# Patient Record
Sex: FEMALE | Race: BLACK OR AFRICAN AMERICAN | NOT HISPANIC OR LATINO | Employment: STUDENT | URBAN - METROPOLITAN AREA
[De-identification: names, ages, dates, MRNs, and addresses within clinical notes are randomized per-mention and may not be internally consistent; named-entity substitution may affect disease eponyms.]

---
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
## 2020-05-05 ENCOUNTER — HOSPITAL ENCOUNTER (EMERGENCY)
Facility: CLINIC | Age: 20
Discharge: HOME | End: 2020-05-05
Attending: NURSE PRACTITIONER | Admitting: NURSE PRACTITIONER
Payer: COMMERCIAL

## 2020-05-05 VITALS
DIASTOLIC BLOOD PRESSURE: 61 MMHG | TEMPERATURE: 97.8 F | HEART RATE: 89 BPM | RESPIRATION RATE: 16 BRPM | WEIGHT: 170 LBS | OXYGEN SATURATION: 97 % | HEIGHT: 63 IN | BODY MASS INDEX: 30.12 KG/M2 | SYSTOLIC BLOOD PRESSURE: 115 MMHG

## 2020-05-05 DIAGNOSIS — L98.9 SKIN LESION: ICD-10-CM

## 2020-05-05 PROCEDURE — 99282 EMERGENCY DEPT VISIT SF MDM: CPT

## 2020-05-05 RX ORDER — SULFAMETHOXAZOLE/TRIMETHOPRIM 800-160 MG
1 TABLET ORAL 2 TIMES DAILY
Qty: 20 TABLET | Refills: 0 | Status: SHIPPED | OUTPATIENT
Start: 2020-05-05 | End: 2020-05-07 | Stop reason: ALTCHOICE

## 2020-05-05 RX ORDER — HYDROCODONE BITARTRATE AND ACETAMINOPHEN 5; 325 MG/1; MG/1
1 TABLET ORAL EVERY 6 HOURS PRN
Qty: 6 TABLET | Refills: 0 | Status: SHIPPED | OUTPATIENT
Start: 2020-05-05 | End: 2020-05-07

## 2020-05-05 ASSESSMENT — MIFFLIN-ST. JEOR: SCORE: 1515.24

## 2020-05-05 ASSESSMENT — ENCOUNTER SYMPTOMS
DYSURIA: 0
CHILLS: 0
FEVER: 0
VOMITING: 0
COUGH: 0
NAUSEA: 0

## 2020-05-05 NOTE — ED TRIAGE NOTES
Itching on back of legs and hands for past 3 weeks - increase itching with pimp like redness on back of legs and under left arm

## 2020-05-05 NOTE — ED AVS SNAPSHOT
Emergency Department  [address] [place] [address]  Phone:  [phone]  Fax:  [phone]                                    Chino Ken   MRN: [id number]    Department:   Emergency Department   Date of Visit:  5/5/2020           After Visit Summary Signature Page    I have received my discharge instructions, and my questions have been answered. I have discussed any challenges I see with this plan with the nurse or doctor.    ..........................................................................................................................................  Patient/Patient Representative Signature      ..........................................................................................................................................  Patient Representative Print Name and Relationship to Patient    ..................................................               ................................................  Date                                   Time    ..........................................................................................................................................  Reviewed by Signature/Title    ...................................................              ..............................................  Date                                               Time          22EPIC Rev 08/18

## 2020-05-05 NOTE — ED PROVIDER NOTES
"  History     Chief Complaint:    Rash    The history is provided by the patient.      Chino Ken is a 19 year old female who presents with a rash. She presents with several small painful bumps over her posterior thighs. This has been ongoing for 2 weeks. She denies any fevers, dysuria, cough, chills, nausea, vomiting, pain with bowel movements, or drainage from the bumps. She has no known history of MRSA. She saw her PCP for this about 1 year ago. She was given an ointment and some pills, which improved the rash. However, this was not found in Care Everywhere during chart review.    Allergies:  Contrast dye    Medications:    Flonase  Oxycodone  Polytrim  Cetaphil  Selenium sulfide  Albuterol inhaler  Zyrtec  Zoloft     Past Medical History:    Asthma  ADHD  Allergic rhinitis    Past Surgical History:    The patient does not have any pertinent past surgical history.    Family History:    No past pertinent family history.    Social History:  Negative for tobacco use.  Negative for alcohol use.  Negative for drug use.  Marital Status:  Single [1]     Review of Systems   Constitutional: Negative for chills and fever.   Respiratory: Negative for cough.    Gastrointestinal: Negative for nausea and vomiting.        Denies pain with bowel movements.   Genitourinary: Negative for dysuria.   Skin: Positive for rash (no drainage).   All other systems reviewed and are negative.        Physical Exam     Patient Vitals for the past 24 hrs:   BP Temp Temp src Pulse Resp SpO2 Height Weight   05/05/20 1427 115/61 97.8  F (36.6  C) Oral 89 16 97 % 1.6 m (5' 3\") 77.1 kg (170 lb)     Physical Exam  Physical Exam   Constitutional:  Laying in bed on her side. Mild discomfort. Non toxic appearing.  Head: Head moves freely with normal range of motion.   ENT: Oropharynx is clear and moist.   Eyes: Conjunctivae pink. EOMs intact.   Neck: Normal range of motion.   Cardiovascular: Regular rate and rhythm. Normal heart sounds. No concerning " "murmur. Intact distal pulses: radial pulses 2+ on the right, 2+ on the left.   Pulmonary/Chest: No respiratory distress. No decreased breath sounds. No wheezes. No rhonchi. No rales. Lungs clear throughout.   Abdominal: Soft. Non-tender. No rebound, no guarding.   Musculoskeletal: No peripheral edema. Distal capillary refill and sensation intact.   Neurological: Oriented to person, place, and time. No focal deficits.   Skin: Several small bumps across the back of her thighs. These have a central raised areas and firmness. No fluctuance to palpation. No erythema or heat to touch. Unable to express purulent drainage from the central area. She has approximately 3 of lesions on each posterior thigh.     Emergency Department Course     Emergency Department Course:  Past medical records, nursing notes, and vitals reviewed.    1521: I performed an exam of the patient as documented above.     I personally reviewed the results with the Patient and answered all related questions prior to discharge.     Findings and plan explained to the Patient. Patient discharged home with instructions regarding supportive care, medications, and reasons to return. The importance of close follow-up was reviewed.     Impression & Plan     Medical Decision Making:  Chino Ken is a 19 year old female with ongoing painful and itchy rash to the back of her thighs. She notes having something similar in the past treated successfully with ointments and \"pills\". I could find no such visit in care everywhere. Here she is afebrile and non-toxic appearing. No exam evidence of cellulitis. She has several small skin lesions that look like small abscesses, although they are quite firm (not fluctuant) and tender and I do not feel amenable to bedside I & D although I did offer to I & D the largest one (measuring about 1.5 cm x 2cm) and she declined. I suspect she has MRSA and will treat as such. We discussed warm soaks, Bactrim and need for recheck in clinic " or here in 3 days and referral from PCP for Dermatology. She is amenable to plan.         Diagnosis:    ICD-10-CM    1. Skin lesion  L98.9     concerning for MRSA     Disposition:  Discharged to home.    Discharge Medications:  New Prescriptions    HYDROCODONE-ACETAMINOPHEN (NORCO) 5-325 MG TABLET    Take 1 tablet by mouth every 6 hours as needed for severe pain    SULFAMETHOXAZOLE-TRIMETHOPRIM (BACTRIM DS) 800-160 MG TABLET    Take 1 tablet by mouth 2 times daily for 10 days     Scribe Disclosure:  ANDREW, Mariaa Delgado, am serving as a scribe at 2:43 PM on 5/5/2020 to document services personally performed by Zully Dubon NP based on my observations and the provider's statements to me.     Mariaa Delgado  5/5/2020    EMERGENCY DEPARTMENT       Zully Dubon, DEIDRE CNP  05/05/20 6711

## 2020-05-07 ENCOUNTER — HOSPITAL ENCOUNTER (EMERGENCY)
Facility: CLINIC | Age: 20
Discharge: HOME | End: 2020-05-07
Attending: EMERGENCY MEDICINE | Admitting: EMERGENCY MEDICINE
Payer: COMMERCIAL

## 2020-05-07 VITALS
WEIGHT: 178.6 LBS | DIASTOLIC BLOOD PRESSURE: 24 MMHG | HEIGHT: 64 IN | BODY MASS INDEX: 30.49 KG/M2 | SYSTOLIC BLOOD PRESSURE: 100 MMHG | TEMPERATURE: 98.7 F | OXYGEN SATURATION: 98 % | HEART RATE: 94 BPM | RESPIRATION RATE: 14 BRPM

## 2020-05-07 DIAGNOSIS — L03.317 ABSCESS AND CELLULITIS OF GLUTEAL REGION: ICD-10-CM

## 2020-05-07 DIAGNOSIS — L02.31 ABSCESS AND CELLULITIS OF GLUTEAL REGION: ICD-10-CM

## 2020-05-07 DIAGNOSIS — L02.412 ABSCESS OF AXILLA, LEFT: ICD-10-CM

## 2020-05-07 DIAGNOSIS — B36.0 TINEA VERSICOLOR: ICD-10-CM

## 2020-05-07 LAB
ALBUMIN SERPL-MCNC: 3.4 G/DL (ref 3.4–5)
ALP SERPL-CCNC: 69 U/L (ref 40–150)
ALT SERPL W P-5'-P-CCNC: 19 U/L (ref 0–50)
ANION GAP SERPL CALCULATED.3IONS-SCNC: 8 MMOL/L (ref 3–14)
AST SERPL W P-5'-P-CCNC: 25 U/L (ref 0–35)
BASOPHILS # BLD AUTO: 0 10E9/L (ref 0–0.2)
BASOPHILS NFR BLD AUTO: 0.1 %
BILIRUB SERPL-MCNC: 0.3 MG/DL (ref 0.2–1.3)
BUN SERPL-MCNC: 7 MG/DL (ref 7–30)
CALCIUM SERPL-MCNC: 8.4 MG/DL (ref 8.5–10.1)
CHLORIDE SERPL-SCNC: 103 MMOL/L (ref 96–110)
CO2 SERPL-SCNC: 22 MMOL/L (ref 20–32)
CREAT SERPL-MCNC: 0.81 MG/DL (ref 0.5–1)
DIFFERENTIAL METHOD BLD: NORMAL
EOSINOPHIL # BLD AUTO: 0 10E9/L (ref 0–0.7)
EOSINOPHIL NFR BLD AUTO: 0.4 %
ERYTHROCYTE [DISTWIDTH] IN BLOOD BY AUTOMATED COUNT: 11.7 % (ref 10–15)
GFR SERPL CREATININE-BSD FRML MDRD: >90 ML/MIN/{1.73_M2}
GLUCOSE SERPL-MCNC: 88 MG/DL (ref 70–99)
GRAM STN SPEC: ABNORMAL
GRAM STN SPEC: ABNORMAL
HBA1C MFR BLD: 5.6 % (ref 0–5.6)
HCG UR QL: NEGATIVE
HCT VFR BLD AUTO: 39.1 % (ref 35–47)
HGB BLD-MCNC: 12.5 G/DL (ref 11.7–15.7)
IMM GRANULOCYTES # BLD: 0 10E9/L (ref 0–0.4)
IMM GRANULOCYTES NFR BLD: 0.3 %
LYMPHOCYTES # BLD AUTO: 2.1 10E9/L (ref 0.8–5.3)
LYMPHOCYTES NFR BLD AUTO: 21.8 %
Lab: ABNORMAL
MCH RBC QN AUTO: 29.4 PG (ref 26.5–33)
MCHC RBC AUTO-ENTMCNC: 32 G/DL (ref 31.5–36.5)
MCV RBC AUTO: 92 FL (ref 78–100)
MONOCYTES # BLD AUTO: 0.4 10E9/L (ref 0–1.3)
MONOCYTES NFR BLD AUTO: 4.3 %
NEUTROPHILS # BLD AUTO: 6.9 10E9/L (ref 1.6–8.3)
NEUTROPHILS NFR BLD AUTO: 73.1 %
NRBC # BLD AUTO: 0 10*3/UL
NRBC BLD AUTO-RTO: 0 /100
PLATELET # BLD AUTO: 272 10E9/L (ref 150–450)
POTASSIUM SERPL-SCNC: 3.3 MMOL/L (ref 3.4–5.3)
PROT SERPL-MCNC: 8 G/DL (ref 6.8–8.8)
RBC # BLD AUTO: 4.25 10E12/L (ref 3.8–5.2)
SODIUM SERPL-SCNC: 133 MMOL/L (ref 133–144)
SPECIMEN SOURCE: ABNORMAL
TSH SERPL DL<=0.005 MIU/L-ACNC: 0.72 MU/L (ref 0.4–4)
WBC # BLD AUTO: 9.5 10E9/L (ref 4–11)

## 2020-05-07 PROCEDURE — 96361 HYDRATE IV INFUSION ADD-ON: CPT | Performed by: EMERGENCY MEDICINE

## 2020-05-07 PROCEDURE — 10061 I&D ABSCESS COMP/MULTIPLE: CPT | Mod: Z6 | Performed by: EMERGENCY MEDICINE

## 2020-05-07 PROCEDURE — 25000128 H RX IP 250 OP 636: Performed by: EMERGENCY MEDICINE

## 2020-05-07 PROCEDURE — 84443 ASSAY THYROID STIM HORMONE: CPT | Performed by: EMERGENCY MEDICINE

## 2020-05-07 PROCEDURE — 25000132 ZZH RX MED GY IP 250 OP 250 PS 637: Performed by: EMERGENCY MEDICINE

## 2020-05-07 PROCEDURE — 25000125 ZZHC RX 250: Performed by: EMERGENCY MEDICINE

## 2020-05-07 PROCEDURE — 25800030 ZZH RX IP 258 OP 636: Performed by: EMERGENCY MEDICINE

## 2020-05-07 PROCEDURE — 99285 EMERGENCY DEPT VISIT HI MDM: CPT | Mod: 25 | Performed by: EMERGENCY MEDICINE

## 2020-05-07 PROCEDURE — 96374 THER/PROPH/DIAG INJ IV PUSH: CPT | Performed by: EMERGENCY MEDICINE

## 2020-05-07 PROCEDURE — 87205 SMEAR GRAM STAIN: CPT | Performed by: EMERGENCY MEDICINE

## 2020-05-07 PROCEDURE — 83036 HEMOGLOBIN GLYCOSYLATED A1C: CPT | Performed by: EMERGENCY MEDICINE

## 2020-05-07 PROCEDURE — 87070 CULTURE OTHR SPECIMN AEROBIC: CPT | Performed by: EMERGENCY MEDICINE

## 2020-05-07 PROCEDURE — 10061 I&D ABSCESS COMP/MULTIPLE: CPT | Performed by: EMERGENCY MEDICINE

## 2020-05-07 PROCEDURE — 87077 CULTURE AEROBIC IDENTIFY: CPT | Performed by: EMERGENCY MEDICINE

## 2020-05-07 PROCEDURE — 80053 COMPREHEN METABOLIC PANEL: CPT | Performed by: EMERGENCY MEDICINE

## 2020-05-07 PROCEDURE — 76882 US LMTD JT/FCL EVL NVASC XTR: CPT | Mod: 26 | Performed by: EMERGENCY MEDICINE

## 2020-05-07 PROCEDURE — 85025 COMPLETE CBC W/AUTO DIFF WBC: CPT | Performed by: EMERGENCY MEDICINE

## 2020-05-07 PROCEDURE — 87186 SC STD MICRODIL/AGAR DIL: CPT | Performed by: EMERGENCY MEDICINE

## 2020-05-07 PROCEDURE — 81025 URINE PREGNANCY TEST: CPT | Performed by: EMERGENCY MEDICINE

## 2020-05-07 PROCEDURE — 96375 TX/PRO/DX INJ NEW DRUG ADDON: CPT | Performed by: EMERGENCY MEDICINE

## 2020-05-07 RX ORDER — ACETAMINOPHEN 500 MG
500 TABLET ORAL EVERY 6 HOURS PRN
Qty: 28 TABLET | Refills: 0 | Status: SHIPPED | OUTPATIENT
Start: 2020-05-07 | End: 2020-05-14

## 2020-05-07 RX ORDER — NALOXONE HYDROCHLORIDE 0.4 MG/ML
.1-.4 INJECTION, SOLUTION INTRAMUSCULAR; INTRAVENOUS; SUBCUTANEOUS
Status: DISCONTINUED | OUTPATIENT
Start: 2020-05-07 | End: 2020-05-07 | Stop reason: HOSPADM

## 2020-05-07 RX ORDER — CLINDAMYCIN HCL 300 MG
300 CAPSULE ORAL 4 TIMES DAILY
Qty: 40 CAPSULE | Refills: 0 | Status: SHIPPED | OUTPATIENT
Start: 2020-05-08 | End: 2020-05-18

## 2020-05-07 RX ORDER — FLUMAZENIL 0.1 MG/ML
0.2 INJECTION, SOLUTION INTRAVENOUS
Status: DISCONTINUED | OUTPATIENT
Start: 2020-05-07 | End: 2020-05-07 | Stop reason: HOSPADM

## 2020-05-07 RX ORDER — SODIUM CHLORIDE 9 MG/ML
1000 INJECTION, SOLUTION INTRAVENOUS CONTINUOUS
Status: DISCONTINUED | OUTPATIENT
Start: 2020-05-07 | End: 2020-05-07 | Stop reason: HOSPADM

## 2020-05-07 RX ORDER — LIDOCAINE HYDROCHLORIDE AND EPINEPHRINE 10; 10 MG/ML; UG/ML
1 INJECTION, SOLUTION INFILTRATION; PERINEURAL ONCE
Status: COMPLETED | OUTPATIENT
Start: 2020-05-07 | End: 2020-05-07

## 2020-05-07 RX ORDER — SERTRALINE HYDROCHLORIDE 25 MG/1
25 TABLET, FILM COATED ORAL DAILY
COMMUNITY
End: 2022-08-01

## 2020-05-07 RX ORDER — HYDROCODONE BITARTRATE AND ACETAMINOPHEN 5; 325 MG/1; MG/1
1 TABLET ORAL ONCE
Status: COMPLETED | OUTPATIENT
Start: 2020-05-07 | End: 2020-05-07

## 2020-05-07 RX ORDER — HYDROXYZINE HYDROCHLORIDE 50 MG/1
50 TABLET, FILM COATED ORAL EVERY 8 HOURS PRN
COMMUNITY

## 2020-05-07 RX ORDER — ONDANSETRON 2 MG/ML
4 INJECTION INTRAMUSCULAR; INTRAVENOUS ONCE
Status: COMPLETED | OUTPATIENT
Start: 2020-05-07 | End: 2020-05-07

## 2020-05-07 RX ORDER — CETIRIZINE HYDROCHLORIDE 10 MG/1
10 TABLET ORAL DAILY
COMMUNITY

## 2020-05-07 RX ORDER — CLINDAMYCIN HCL 300 MG
300 CAPSULE ORAL ONCE
Status: COMPLETED | OUTPATIENT
Start: 2020-05-07 | End: 2020-05-07

## 2020-05-07 RX ORDER — SELENIUM SULFIDE 2.5 MG/100ML
LOTION TOPICAL DAILY
Qty: 118 ML | Refills: 1 | Status: SHIPPED | OUTPATIENT
Start: 2020-05-07 | End: 2020-05-14

## 2020-05-07 RX ADMIN — Medication 80 MG: at 19:48

## 2020-05-07 RX ADMIN — CLINDAMYCIN HYDROCHLORIDE 300 MG: 300 CAPSULE ORAL at 21:20

## 2020-05-07 RX ADMIN — ONDANSETRON 4 MG: 2 INJECTION INTRAMUSCULAR; INTRAVENOUS at 20:11

## 2020-05-07 RX ADMIN — LIDOCAINE HYDROCHLORIDE,EPINEPHRINE BITARTRATE 1 ML: 10; .01 INJECTION, SOLUTION INFILTRATION; PERINEURAL at 20:04

## 2020-05-07 RX ADMIN — Medication 40 MG: at 19:58

## 2020-05-07 RX ADMIN — HYDROCODONE BITARTRATE AND ACETAMINOPHEN 1 TABLET: 5; 325 TABLET ORAL at 21:33

## 2020-05-07 RX ADMIN — Medication 20 MG: at 20:03

## 2020-05-07 RX ADMIN — SODIUM CHLORIDE 1000 ML: 9 INJECTION, SOLUTION INTRAVENOUS at 19:34

## 2020-05-07 ASSESSMENT — ENCOUNTER SYMPTOMS
UNEXPECTED WEIGHT CHANGE: 1
RECTAL PAIN: 0
DYSURIA: 0
COUGH: 0
VOMITING: 0
ABDOMINAL PAIN: 0
ANAL BLEEDING: 0
POLYPHAGIA: 0
SHORTNESS OF BREATH: 0
POLYDIPSIA: 0
NAUSEA: 0
FEVER: 0

## 2020-05-07 ASSESSMENT — MIFFLIN-ST. JEOR: SCORE: 1570.12

## 2020-05-07 NOTE — ED TRIAGE NOTES
Pt. Presents to ED with cysts on her L axilla and her buttocks that started about a week ago. Pt. Also reports her nose hurts and shes sensitive to smells. AVSS on RA. A & O x 4, independent.   
yes

## 2020-05-07 NOTE — ED AVS SNAPSHOT
[facility], [place], Emergency Department  [address] [place] [address]  Phone:  [phone]                                    Chino Ken   MRN: [id number]    Department:  [facility], Emergency Department   Date of Visit:  5/7/2020           After Visit Summary Signature Page    I have received my discharge instructions, and my questions have been answered. I have discussed any challenges I see with this plan with the nurse or doctor.    ..........................................................................................................................................  Patient/Patient Representative Signature      ..........................................................................................................................................  Patient Representative Print Name and Relationship to Patient    ..................................................               ................................................  Date                                   Time    ..........................................................................................................................................  Reviewed by Signature/Title    ...................................................              ..............................................  Date                                               Time          22EPIC Rev 08/18

## 2020-05-07 NOTE — ED PROVIDER NOTES
[facility] EMERGENCY DEPARTMENT ([facility])  May 7, 2020  History     Chief Complaint   Patient presents with     Cyst     HPI  Chino Ken is a 19 year old female with PMH obesity and prediabetes who presents to the Emergency Department with c/o painful bumps on buttocks and left armpit. She was seen on 5/5/2020 at [facility] Emergency Department. Patient presented with several small, painful bumps over posterior thighs ongoing for 2 weeks. Patient reported similar symptoms 1 year prior, where she was given ointment and pills. Patient suspected to have MRSA and treated as such. She was discharged with prescription for a 10 day course of Bactrim and advised to recheck in 3 days and obtain referral from PCP for dermatology. Today patient reports she has been taking the bactrim but developed new draining bumps on her buttocks and a painful bump in her left armpit last night. She tried to pop one of them and has had a lot of drainage. She also complains of white flakiness and itchiness all over. She denies h/o similar draining painful wounds and has never required drainage. Denies rectal pain or pain with bowel movements. Does have pain when seated. Denies fever, cough, cp, sob.     On ROS, patient reports rapid unintentional weight loss. Denies polyuria, polydipsia.       PAST MEDICAL HISTORY: History reviewed. No pertinent past medical history.    PAST SURGICAL HISTORY: History reviewed. No pertinent surgical history.    Past medical history, past surgical history, medications, and allergies were reviewed with the patient. Additional pertinent items: None    FAMILY HISTORY: History reviewed. No pertinent family history.    SOCIAL HISTORY:   Social History     Tobacco Use     Smoking status: Never Smoker     Smokeless tobacco: Never Used   Substance Use Topics     Alcohol use: Never     Frequency: Never     Social history was reviewed with the patient. Additional pertinent items: None      Patient's  Medications   New Prescriptions    ACETAMINOPHEN (TYLENOL) 500 MG TABLET    Take 1 tablet (500 mg) by mouth every 6 hours as needed for pain or fever    CLINDAMYCIN (CLEOCIN) 300 MG CAPSULE    Take 1 capsule (300 mg) by mouth 4 times daily for 10 days    SELENIUM SULFIDE (SELSUN) 2.5 % EXTERNAL LOTION    Apply topically daily for 7 days Apply to rash. Leave on for 10 minutes and rinse with water.   Previous Medications    CETIRIZINE (ZYRTEC ALLERGY) 10 MG TABLET    Take 10 mg by mouth daily    HYDROXYZINE (ATARAX) 50 MG TABLET    Take 50 mg by mouth every 8 hours as needed for itching    SERTRALINE (ZOLOFT) 25 MG TABLET    Take 25 mg by mouth daily    SULFAMETHOXAZOLE-TRIMETHOPRIM (BACTRIM DS) 800-160 MG TABLET    Take 1 tablet by mouth 2 times daily for 10 days   Modified Medications    No medications on file   Discontinued Medications    HYDROCODONE-ACETAMINOPHEN (NORCO) 5-325 MG TABLET    Take 1 tablet by mouth every 6 hours as needed for severe pain    IBUPROFEN (ADVIL/MOTRIN) 600 MG TABLET    Take 1 tablet (600 mg) by mouth every 6 hours as needed for moderate pain    OXYCODONE (ROXICODONE) 5 MG TABLET    Take 1 tablet (5 mg) by mouth every 6 hours as needed for pain    TRIMETHOPRIM-POLYMYXIN B (POLYTRIM) 64678-8.1 UNIT/ML-% OPHTHALMIC SOLUTION    Place 1-2 drops into the right eye every 4 hours        No Known Allergies     Review of Systems   Constitutional: Positive for unexpected weight change. Negative for fever.   Respiratory: Negative for cough and shortness of breath.    Cardiovascular: Negative for chest pain.   Gastrointestinal: Negative for abdominal pain, anal bleeding, nausea, rectal pain and vomiting.   Endocrine: Negative for polydipsia, polyphagia and polyuria.   Genitourinary: Positive for decreased urine volume. Negative for dysuria.   Skin: Positive for rash.   All other systems reviewed and are negative.    A complete review of systems was performed with pertinent positives and negatives  "noted in the HPI, and all other systems negative.    Physical Exam   BP: 103/55  Pulse: 66  Heart Rate: 112  Temp: 98.7  F (37.1  C)  Resp: 16  Height: 162.6 cm (5' 4\")  Weight: 81 kg (178 lb 9.6 oz)  SpO2: 99 %      Physical Exam  Vitals signs and nursing note reviewed.   Constitutional:       General: She is not in acute distress.     Appearance: She is well-developed. She is obese. She is not ill-appearing or diaphoretic.   HENT:      Head: Normocephalic and atraumatic.      Nose: Nose normal. No rhinorrhea.      Mouth/Throat:      Mouth: Mucous membranes are moist.   Eyes:      General: No scleral icterus.     Conjunctiva/sclera: Conjunctivae normal.   Neck:      Musculoskeletal: Normal range of motion and neck supple. No neck rigidity.   Cardiovascular:      Rate and Rhythm: Normal rate.   Pulmonary:      Effort: Pulmonary effort is normal. No respiratory distress.      Breath sounds: No stridor.   Abdominal:      General: There is no distension.      Palpations: Abdomen is soft.   Musculoskeletal: Normal range of motion.         General: No deformity or signs of injury.      Right lower leg: No edema.      Left lower leg: No edema.   Skin:     General: Skin is warm and dry.      Coloration: Skin is not jaundiced.      Findings: Abscess, erythema, lesion and rash present. Rash is macular and scaling.      Comments: Hypopigmented and hyperpigmented macules on trunk and upper extremities. Most visible on upper back. Hyperpigmented skin in groin. Several papular scars in axilla and groin. Tender pustules with surrounding erythema, induration, and fluctuance within left axilla and bilateral gluteal folds/proximal posterior thighs. Some actively draining.   Neurological:      General: No focal deficit present.      Mental Status: She is alert and oriented to person, place, and time.   Psychiatric:         Mood and Affect: Mood is anxious. Affect is labile.         Behavior: Behavior normal. Behavior is cooperative.  " "       Judgment: Judgment is impulsive.         ED Course        [facility] acute hospital, [place]    PROCEDURE: -Incision/Drainage    Date/Time: 5/7/2020 7:36 PM  Performed by: Graciela Tucker MD  Authorized by: Graciela Tucker MD       LOCATION:      Type:  Abscess    Size:  4 cm, 3 cm, 2 cm, 4 cm, 1 cm, 3 cm    Location: left axilla, bilateral gluteus/posterior proximal thighs.    PRE-PROCEDURE DETAILS:     Skin preparation:  Chloraprep    ANESTHESIA (see MAR for exact dosages):     Anesthesia method:  Local infiltration    Local anesthetic:  Lidocaine 1% WITH epi    PROCEDURE TYPE:     Complexity:  Complex    PROCEDURE DETAILS:     Incision types:  Single with marsupialization    Incision depth:  Subcutaneous    Scalpel blade:  11    Wound management:  Probed and deloculated    Drainage:  Bloody and purulent    Drainage amount:  Moderate    Wound treatment:  Wound left open    Packing materials:  1/4 in iodoform gauze    Amount 1/4\" iodoform:  5 of 6 wounds packed  Post-procedure details:     PROCEDURE   Patient Tolerance:  Patient tolerated the procedure well with no immediate complications  Describe Procedure: Procedural sedation performed by Dr. Ferrell. Please see his separate procedural documentation for details.     Results for orders placed during the hospital encounter of 05/07/20   POC US SOFT TISSUE    Impression Limited Soft Tissue Ultrasound, performed and interpreted by me.    Indication:  Skin redness warmth pain. Evaluate for cellulitis vs abscess.     Body location: buttock and left axilla    Findings:  There is cobblestoning suggestive of cellulitis in the evaluated area. There are multiple fluid collections consistent with abscesses. No foreign body identified    IMPRESSION: Abscess and Cellulitis                                 Results for orders placed or performed during the hospital encounter of 05/07/20 (from the past 24 hour(s))   HCG qualitative urine (UPT)   Result Value Ref " Range    HCG Qual Urine Negative NEG^Negative   POC US SOFT TISSUE    Impression    Limited Soft Tissue Ultrasound, performed and interpreted by me.    Indication:  Skin redness warmth pain. Evaluate for cellulitis vs abscess.     Body location: buttock and left axilla    Findings:  There is cobblestoning suggestive of cellulitis in the evaluated area. There are multiple fluid collections consistent with abscesses. No foreign body identified    IMPRESSION: Abscess and Cellulitis         CBC with platelets differential   Result Value Ref Range    WBC 9.5 4.0 - 11.0 10e9/L    RBC Count 4.25 3.8 - 5.2 10e12/L    Hemoglobin 12.5 11.7 - 15.7 g/dL    Hematocrit 39.1 35.0 - 47.0 %    MCV 92 78 - 100 fl    MCH 29.4 26.5 - 33.0 pg    MCHC 32.0 31.5 - 36.5 g/dL    RDW 11.7 10.0 - 15.0 %    Platelet Count 272 150 - 450 10e9/L    Diff Method Automated Method     % Neutrophils 73.1 %    % Lymphocytes 21.8 %    % Monocytes 4.3 %    % Eosinophils 0.4 %    % Basophils 0.1 %    % Immature Granulocytes 0.3 %    Nucleated RBCs 0 0 /100    Absolute Neutrophil 6.9 1.6 - 8.3 10e9/L    Absolute Lymphocytes 2.1 0.8 - 5.3 10e9/L    Absolute Monocytes 0.4 0.0 - 1.3 10e9/L    Absolute Eosinophils 0.0 0.0 - 0.7 10e9/L    Absolute Basophils 0.0 0.0 - 0.2 10e9/L    Abs Immature Granulocytes 0.0 0 - 0.4 10e9/L    Absolute Nucleated RBC 0.0    Comprehensive metabolic panel   Result Value Ref Range    Sodium 133 133 - 144 mmol/L    Potassium 3.3 (L) 3.4 - 5.3 mmol/L    Chloride 103 96 - 110 mmol/L    Carbon Dioxide 22 20 - 32 mmol/L    Anion Gap 8 3 - 14 mmol/L    Glucose 88 70 - 99 mg/dL    Urea Nitrogen 7 7 - 30 mg/dL    Creatinine 0.81 0.50 - 1.00 mg/dL    GFR Estimate >90 >60 mL/min/[1.73_m2]    GFR Estimate If Black >90 >60 mL/min/[1.73_m2]    Calcium 8.4 (L) 8.5 - 10.1 mg/dL    Bilirubin Total 0.3 0.2 - 1.3 mg/dL    Albumin 3.4 3.4 - 5.0 g/dL    Protein Total 8.0 6.8 - 8.8 g/dL    Alkaline Phosphatase 69 40 - 150 U/L    ALT 19 0 - 50 U/L     AST 25 0 - 35 U/L   TSH with free T4 reflex   Result Value Ref Range    TSH 0.72 0.40 - 4.00 mU/L     Medications   naloxone (NARCAN) injection 0.1-0.4 mg (has no administration in time range)   flumazenil (ROMAZICON) injection 0.2 mg (has no administration in time range)   sodium chloride (PF) 0.9% PF flush 3 mL (has no administration in time range)   sodium chloride (PF) 0.9% PF flush 3 mL (has no administration in time range)   0.9% sodium chloride BOLUS (0 mLs Intravenous Stopped 5/7/20 2036)     Followed by   sodium chloride 0.9% infusion (has no administration in time range)   clindamycin (CLEOCIN) capsule 300 mg (has no administration in time range)   HYDROcodone-acetaminophen (NORCO) 5-325 MG per tablet 1 tablet (has no administration in time range)   ketamine (KETALAR) injection 80 mg (80 mg Intravenous Given 5/7/20 1948)   ketamine (KETALAR) injection 80 mg (40 mg Intravenous Given 5/7/20 1958)   lidocaine 1% with EPINEPHrine 1:100,000 injection 1 mL (1 mL Intradermal Given 5/7/20 2004)   ketamine (KETALAR) injection 20 mg (20 mg Intravenous Given 5/7/20 2003)   ondansetron (ZOFRAN) injection 4 mg (4 mg Intravenous Given 5/7/20 2011)             Assessments & Plan (with Medical Decision Making)   Chino Ken is a 19 year old female with PMH obesity and prediabetes who presents to the Emergency Department with c/o painful bumps on buttocks and left armpit.     Ddx: abscesses, hidradenitis suppurativa, MRSA, cellulitis, new onset diabetes, tinea versicolor, eczema    Patient with multiple abscesses after failure of outpatient bactrim. Discussed risks/benefits of procedural sedation in addition to local anesthesia. Patient very anxious and does not cope well with pain so preferred sedation. Consented to sedation with Ketamine for I&D. Tolerated procedure well. Packing placed in 5 out of 6 wounds.     Discontinue bactrim and change to clindamycin for presumed CA-MRSA. May not be treatment failure with likely  evolution of abscesses present on previous presentation but will swap out abx in case of resistance. Labs wnl. Sent Hgb A1c and patient can follow up with PCP for results. Also given course of selenium sulfide shampoo for tinea versicolor rash. Discussed she may require oral treatment if she fails topical. Recommended f/u with PCP in 2-3 days. Wound care instructions and return precautions provided.       I have reviewed the nursing notes.    I have reviewed the findings, diagnosis, plan and need for follow up with the patient.    New Prescriptions    ACETAMINOPHEN (TYLENOL) 500 MG TABLET    Take 1 tablet (500 mg) by mouth every 6 hours as needed for pain or fever    CLINDAMYCIN (CLEOCIN) 300 MG CAPSULE    Take 1 capsule (300 mg) by mouth 4 times daily for 10 days    SELENIUM SULFIDE (SELSUN) 2.5 % EXTERNAL LOTION    Apply topically daily for 7 days Apply to rash. Leave on for 10 minutes and rinse with water.       Final diagnoses:   Abscess and cellulitis of gluteal region   Tinea versicolor   Abscess of axilla, left       5/7/2020   [facility], [place], EMERGENCY DEPARTMENT     Graciela Tucker MD  05/07/20 1696

## 2020-05-08 NOTE — DISCHARGE INSTRUCTIONS
Stop taking bactrim and instead take clindamycin as prescribed to treat your skin and soft tissue infection.  Keep the area clean and dry.  Check the area regularly for signs of worsening infection such as increased redness, pain, swelling.  The wound will continue to drain for the next few days, this is normal.      Take tylenol, for pain and inflammation.  Take this medication with food to prevent stomach upset.  If you taking a chronic antacid medication, make sure to take this while you are taking this medication.    Perform sitz baths, as above to keep wounds clean. It is ok if packing falls out of wounds before follow up but they will remove it at your next appointment in 2-3 days if it remains in place. You should expect a fair amount of drainage and some bleeding from the wounds to continue for the next few days.   Apply the Selsun lotion to rash. You may require a pill to treat this rash, if the lotion does not work. Ask your doctor about this when you follow up.    Follow-up with your primary care doctor in 2-3 days for a recheck of the wound and remove packing.    Return to the emergency department if you develop worsening swelling, pain, fever, or significant bleeding that will not stop after applying pressure to area.

## 2020-05-08 NOTE — ED NOTES
Sedation:   Performed by: Brennen Ferrell DO  Authorized by: Brennen Ferrell DO    Pre-Procedure Assessment done at 1930.    Expected Level:  Moderate Sedation    Indication:  Sedation is required to allow for Incision and drainage of abcess    Consent obtained from patient after discussing the risks, benefits and alternatives.    PO Intake:  Appropriately NPO for procedure    ASA Class:  Class 2 - MILD SYSTEMIC DISEASE, NO ACUTE PROBLEMS, NO FUNCTIONAL LIMITATIONS.    Mallampati:  Grade 2:  Soft palate, base of uvula, tonsillar pillars, and portion of posterior pharyngeal wall visible    Lungs: Lungs Clear with good breath sounds bilaterally.     Heart: Normal heart sounds and rate    History and physical reviewed and no updates needed. I have reviewed the lab findings, diagnostic data, medications, and the plan for sedation. I have determined this patient to be an appropriate candidate for the planned sedation and procedure and have reassessed the patient IMMEDIATELY PRIOR to sedation and procedure.      Sedation Post Procedure Summary:    Prior to the start of the procedure and with procedural staff participation, I verbally confirmed the patient s identity using two indicators, relevant allergies, that the procedure was appropriate and matched the consent or emergent situation, and that the correct equipment/implants were available. Immediately prior to starting the procedure I conducted the Time Out with the procedural staff and re-confirmed the patient s name, procedure, and site/side. (The Joint Commission universal protocol was followed.)  Yes      Sedatives: Ketamine    Vital signs, airway, End Tidal CO2 and pulse oximetry were monitored and remained stable throughout the procedure and sedation was maintained until the procedure was complete.  The patient was monitored by staff until sedation discharge criteria were met.    Patient tolerance: Patient tolerated the procedure well with no immediate  complications.    Time of sedation in minutes:  25 minutes from beginning to end of physician one to one monitoring.             Brennen Ferrell DO  05/07/20 2014

## 2020-05-08 NOTE — ED NOTES
Pt. tolerated conscious sedation and I/D procedure well overall. AVSS on 3LPM NC throughout procedure, airway and breathing intact. ED MD Tucker successful in draining and packing (5) abscess sites (L axilla, gluteal fold). Bleeding from sites was minimal and stopped spontaneously or with minimal pressure. Drainage sites cleansed with chlorprep and dressed with vaseline gauze and telfa pads. Pt. Tearful coming out of sedation but easily reassured and followed commands throughout. Pt. Now sleepy but oriented x 4 and conversational. Tearfulness improving but patient complaining of all over body pain. Pt. Educated on plan for recovery, PO challenge, and then eventual discharge home. Pt. Reports that her sister Merced would be providing her ride home. Pt. Talking to her sister throughout ED encounter and called her while recovering. Sister Merced arrived @ ED but was educated that she could not come in but reassured that her sister tolerated the procedure well and was recovering well. Merced asked that we call her when she was ready to discharge and reported concerns regarding prescribing opioid pain meds for the patient post procedure. Merced reports that she has a HX of abusing pain meds in the past and that if we can avoid prescribing them to her we should. ED MD Tucker and Primary RN Aaliyah notified of conversation. Merced provided her phone number for us to call when patient is ready for discharge, [phone].

## 2020-05-10 LAB
BACTERIA SPEC CULT: ABNORMAL
Lab: ABNORMAL
SPECIMEN SOURCE: ABNORMAL

## 2020-05-27 ENCOUNTER — HOSPITAL ENCOUNTER (EMERGENCY)
Facility: CLINIC | Age: 20
Discharge: HOME | End: 2020-05-27
Attending: FAMILY MEDICINE | Admitting: FAMILY MEDICINE
Payer: COMMERCIAL

## 2020-05-27 VITALS
DIASTOLIC BLOOD PRESSURE: 66 MMHG | OXYGEN SATURATION: 97 % | RESPIRATION RATE: 16 BRPM | TEMPERATURE: 96.5 F | SYSTOLIC BLOOD PRESSURE: 97 MMHG

## 2020-05-27 DIAGNOSIS — Z03.818 ENCNTR FOR OBS FOR SUSP EXPSR TO OTH BIOLG AGENTS RULED OUT: ICD-10-CM

## 2020-05-27 DIAGNOSIS — Z20.822 EXPOSURE TO COVID-19 VIRUS: ICD-10-CM

## 2020-05-27 PROCEDURE — 99281 EMR DPT VST MAYX REQ PHY/QHP: CPT | Mod: Z6 | Performed by: FAMILY MEDICINE

## 2020-05-27 PROCEDURE — 99281 EMR DPT VST MAYX REQ PHY/QHP: CPT

## 2020-05-27 NOTE — ED AVS SNAPSHOT
[facility], [place], Emergency Department  [address]  [facility] [address]  Phone:  [phone]  Fax:  [phone]                                    Chino Ken   MRN: [id number]    Department:  [facility], Emergency Department   Date of Visit:  5/27/2020           After Visit Summary Signature Page    I have received my discharge instructions, and my questions have been answered. I have discussed any challenges I see with this plan with the nurse or doctor.    ..........................................................................................................................................  Patient/Patient Representative Signature      ..........................................................................................................................................  Patient Representative Print Name and Relationship to Patient    ..................................................               ................................................  Date                                   Time    ..........................................................................................................................................  Reviewed by Signature/Title    ...................................................              ..............................................  Date                                               Time          22EPIC Rev 08/18

## 2020-05-27 NOTE — DISCHARGE INSTRUCTIONS
Discharge from the emergency room with recommendation to contact [email or web address] with possible referral for a testing center if indicated.

## 2020-05-27 NOTE — ED PROVIDER NOTES
ED Provider Note  [facility]      History     Chief Complaint   Patient presents with     Covid 19 Testing     Pt is asymptomatic but wants to be tested for COVID     HPI  Chino Ken is a 19 year old female who arrives emergency room currently asymptomatic with no fevers cough shortness of breath but requesting testing for COVID.  I spent a significant amount of time discussing our testing procedures and in light of the fact that she thinks she may have had an exposure in the past I have discussed with her the possibility of going to [facility] and being evaluated for possible testing at an outside testing center.  Patient did not qualify for testing here in the emergency room per our protocols.    Past Medical History  No past medical history on file.  No past surgical history on file.  cetirizine (ZYRTEC ALLERGY) 10 MG tablet  hydrOXYzine (ATARAX) 50 MG tablet  sertraline (ZOLOFT) 25 MG tablet      No Known Allergies  Past medical history, past surgical history, medications, and allergies were reviewed with the patient. Additional pertinent items: None    Family History  No family history on file.  Family history was reviewed with the patient. Additional pertinent items: None    Social History  Social History     Tobacco Use     Smoking status: Never Smoker     Smokeless tobacco: Never Used   Substance Use Topics     Alcohol use: Never     Frequency: Never     Drug use: Never      Social history was reviewed with the patient. Additional pertinent items: None    Review of Systems  A complete review of systems was performed with pertinent positives and negatives noted in the HPI, and all other systems negative.    Physical Exam   BP: 97/66  Temp: 96.5  F (35.8  C)  Resp: 16  SpO2: 97 %  Physical Exam  Constitutional:       General: She is not in acute distress.     Appearance: She is not diaphoretic.   HENT:      Head: Atraumatic.      Mouth/Throat:      Pharynx: No oropharyngeal  exudate.   Eyes:      General: No scleral icterus.     Pupils: Pupils are equal, round, and reactive to light.   Cardiovascular:      Heart sounds: Normal heart sounds.   Pulmonary:      Effort: No respiratory distress.      Breath sounds: Normal breath sounds. No wheezing or rhonchi.   Abdominal:      General: Bowel sounds are normal.      Palpations: Abdomen is soft.      Tenderness: There is no abdominal tenderness.   Musculoskeletal:         General: No tenderness.   Skin:     General: Skin is warm.      Findings: No rash.   Neurological:      General: No focal deficit present.      Mental Status: She is oriented to person, place, and time.         ED Course      Procedures             Assessments & Plan (with Medical Decision Making)         I have reviewed the nursing notes. I have reviewed the findings, diagnosis, plan and need for follow up with the patient.    Patient with possible exposure to COVID-19 now presenting requesting testing she does not qualify for testing at this time being asymptomatic no symptoms with normal respirations normal O2 sats and no fever patient was given instructions to contact on care if she develops any concerns and that she could possibly have testing on an outpatient basis.    Final diagnoses:   Exposure to Covid-19 Virus       --  Howie Chavez MD   Emergency Medicine   [facility] EMERGENCY DEPARTMENT  5/27/2020     Howie Chavez MD  05/27/20 3729

## 2020-07-16 ENCOUNTER — HOSPITAL ENCOUNTER (EMERGENCY)
Facility: CLINIC | Age: 20
Discharge: HOME | End: 2020-07-17
Attending: EMERGENCY MEDICINE | Admitting: EMERGENCY MEDICINE
Payer: COMMERCIAL

## 2020-07-16 DIAGNOSIS — S69.91XA HAND INJURY, RIGHT, INITIAL ENCOUNTER: ICD-10-CM

## 2020-07-16 DIAGNOSIS — S60.511A ABRASION OF RIGHT HAND, INITIAL ENCOUNTER: ICD-10-CM

## 2020-07-16 PROCEDURE — 99283 EMERGENCY DEPT VISIT LOW MDM: CPT | Performed by: EMERGENCY MEDICINE

## 2020-07-16 PROCEDURE — 99283 EMERGENCY DEPT VISIT LOW MDM: CPT | Mod: Z6 | Performed by: EMERGENCY MEDICINE

## 2020-07-16 NOTE — ED AVS SNAPSHOT
[facility], [place], Emergency Department  [address]  [facility] [address]  Phone:  [phone]  Fax:  [phone]                                    Chino Ken   MRN: [id number]    Department:  [facility], Emergency Department   Date of Visit:  7/16/2020           After Visit Summary Signature Page    I have received my discharge instructions, and my questions have been answered. I have discussed any challenges I see with this plan with the nurse or doctor.    ..........................................................................................................................................  Patient/Patient Representative Signature      ..........................................................................................................................................  Patient Representative Print Name and Relationship to Patient    ..................................................               ................................................  Date                                   Time    ..........................................................................................................................................  Reviewed by Signature/Title    ...................................................              ..............................................  Date                                               Time          22EPIC Rev 08/18

## 2020-07-17 ENCOUNTER — APPOINTMENT (OUTPATIENT)
Dept: GENERAL RADIOLOGY | Facility: CLINIC | Age: 20
End: 2020-07-17
Attending: EMERGENCY MEDICINE
Payer: COMMERCIAL

## 2020-07-17 VITALS
TEMPERATURE: 98.6 F | HEART RATE: 68 BPM | DIASTOLIC BLOOD PRESSURE: 70 MMHG | SYSTOLIC BLOOD PRESSURE: 100 MMHG | RESPIRATION RATE: 16 BRPM | OXYGEN SATURATION: 100 %

## 2020-07-17 PROCEDURE — 73130 X-RAY EXAM OF HAND: CPT | Mod: RT

## 2020-07-17 PROCEDURE — 25000132 ZZH RX MED GY IP 250 OP 250 PS 637: Performed by: EMERGENCY MEDICINE

## 2020-07-17 RX ORDER — IBUPROFEN 600 MG/1
600 TABLET, FILM COATED ORAL ONCE
Status: COMPLETED | OUTPATIENT
Start: 2020-07-17 | End: 2020-07-17

## 2020-07-17 RX ADMIN — IBUPROFEN 600 MG: 600 TABLET ORAL at 00:10

## 2020-07-17 NOTE — ED PROVIDER NOTES
ED Provider Note  [facility]      History     Chief Complaint   Patient presents with     Laceration     HPI  Chino Ken is a 19 year old female who is presenting with hand injury.  Approximately half hour prior to arrival the patient states that she punched through glass with her right hand.  She has some small cuts on her hand with no bleeding at this point.  She is able to move her fingers and wrist without difficulty she states.  Denies any other injuries.  She is not on blood thinners.  She is unsure about her tetanus however she deferred this today if she needs it.  She would like ibuprofen for pain.  No numbness, tingling or weakness.    Past Medical History  No past medical history on file.  No past surgical history on file.  cetirizine (ZYRTEC ALLERGY) 10 MG tablet  hydrOXYzine (ATARAX) 50 MG tablet  sertraline (ZOLOFT) 25 MG tablet      No Known Allergies  Past medical history, past surgical history, medications, and allergies were reviewed with the patient. Additional pertinent items: None    Family History  No family history on file.  Family history was reviewed with the patient. Additional pertinent items: None    Social History  Social History     Tobacco Use     Smoking status: Never Smoker     Smokeless tobacco: Never Used   Substance Use Topics     Alcohol use: Never     Frequency: Never     Drug use: Never      Social history was reviewed with the patient. Additional pertinent items: None    Review of Systems  A complete review of systems was performed with pertinent positives and negatives noted in the HPI, and all other systems negative.    Physical Exam   BP: 97/69  Pulse: 62  Temp: 98.3  F (36.8  C)  Resp: 16  SpO2: 100 %  Physical Exam  Physical Exam   Constitutional: oriented to person, place, and time. appears well-developed and well-nourished.   HENT:   Head: Normocephalic and atraumatic.   Neck: Normal range of motion.   Pulmonary/Chest: Effort normal. No  respiratory distress.   Cardiac: No murmurs, rubs, gallops. RRR.  Abdominal: Abdomen soft, nontender, nondistended. No rebound tenderness.  MSK: Right wrist with superficial abrasion, 1 cm, no laceration.  Radial pulses symmetric and intact bilaterally.  Right hand with punctate palmar abrasion with dried blood.  There is also small abrasion over the fourth MCP, no laceration, no bleeding..  Able to flex and extend all digits without difficulty of the right hand.  Able to oppose thumb to finger and pinky without difficulty.  No weakness of the digits or hand.   strength symmetric and 5 5 bilaterally.  Range of motion wrist intact bilaterally.  Sensation grossly tact to all digits to light touch.  Neurological: alert and oriented to person, place, and time.   Skin: Skin is warm and dry.   Psychiatric:  normal mood and affect.  behavior is normal. Thought content normal.     ED Course      Procedures             No results found for any visits on 07/16/20.  Medications   ibuprofen (ADVIL/MOTRIN) tablet 600 mg (has no administration in time range)        Assessments & Plan (with Medical Decision Making)   MDM  Patient presenting with wounds to the hand after hitting glass.  There is no obvious foreign body on exam, will assess with x-ray to assess for fracture possible other foreign body.  Hand is otherwise neurovascularly intact with apparent intact tendons motor function.  She is given ibuprofen for pain.  Tetanus is up-to-date.    Re eval: XR normal. Patient to be discharged with recs to use topical abx ointment. No signs infection. No foreign body. Patient to follow up with PCP if any problems arise.    I have reviewed the nursing notes. I have reviewed the findings, diagnosis, plan and need for follow up with the patient.    New Prescriptions    No medications on file       Final diagnoses:   Hand injury, right, initial encounter   Abrasion of right hand, initial encounter       --  Sadi Strange MD    Emergency Medicine   [facility], [place], EMERGENCY DEPARTMENT  7/16/2020     Sadi Strange MD  07/17/20 0051

## 2020-07-17 NOTE — ED NOTES
"Patient refused pregnancy test. She stated \"I'm not pregnant\"  " On 8/20/2018, Briana LOERA, CT scribed the services personally performed by Ila Robert DC.      Date of Injury: 08/20/18    Initial Treatment Date: 05/15/2018  Previous Treatment Date: 5/17/2018  Current Treatment Date: 8/20/2018   Chief Complaint   Patient presents with   • Pain     low back      Mechanism of Injury: Suddenly  Date informed consent signed:  05/15/2018  Advance Beneficiary Notice signed: not appicable at this time    Visit Number of Current Episode: PRN  Discharged from Active Care: No    Occupation:   Referred by: Maryjane Huynh / patient  Primary Care Physician: Sondra Knight MD    REVIEW OF SYSTEMS  Constitutional: Negative for fever chills, malaise, weight loss/gain, or loss of appetite.    Skin: Negative for rash, or persistent itching.   HEENT: Negative for eye drainage, ear pain, sore throat, or sinus problems.  Respiratory: Negative cough, wheezing, or shortness of breath.    Cardiovascular: Negative for chest pain, chest pressure, palpitations, leg cramps, or lower extremity edema.  Gastrointestinal: Negative for nausea, vomiting, diarrhea, abdominal pain, constipation, or heartburn.    Genitourinary: Negative for dysuria, frequency, hematuria, or nocturia.   Extremities:  Negative for joint swelling or joint pain.  Endocrine: Negative for heat or cold intolerance.  Psychiatric: Negative for change in mood, mentation, anxiety, depression, or insomnia.   Neurologic: Negative for visual changes, loss of balance, dizziness, or tremors.      I have reviewed the past medical history, past surgical history, family history, social history, medications and allergies listed in the medical record as obtained by my nursing staff and support staff and agree with their documentation.    Subjective:  Erum is a pleasant 59 year old female that presents to our office today for an evaluation and treatment of back pain with lower back regions, more right sided without radicular  symptoms.  Having back pain which flared up about a week ago, on the right side into her hip area around to the back.  Stated she did modify her work outs 2 months ago and stopped doing [employer] and [employer] class. 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   Stays active with yoga, piliates and swimming weekly.  Heading on vacation to [place] this weekend.                Patient rates the current pain at a 3-4/10 with 10 being the worst.  Patient describes pain as aching and stiff    Patient’s current work status: Currently working.  Patient notes that the pain is worse when sitting  Patient notes that the pain is reduced with nothing  Patient has not sought prior medical treatment for this episode.  Patient has not sought prior Chiropractic treatment for this episode.    Diagnostic Studies relevant to this episode: None  Hospitalizations relevant to this episode: NO    Objective Findings:   There were no vitals taken for this visit.   Patient scored a 9/40 on the [employer].    Problem focus examination revealed:    (Lumbar and Pelvic spine) Lumbar spine facet joint function is within normal limits. Sacroiliac joint function is within normal limits except for extension of right sacroiliac joint that exhibited limited passive range of motion and joint restriction; The following muscles were examined for flexibility and tone at rest; right hip flexor, left hip flexor, right hip rotator, left hip rotator, right piriformis, left piriformis, right hamstring, left hamstring, right lumbar paraspinals, left lumbar paraspinals, right gluteus medius, left gluteus medius, right quadratus lumborum, left quadratus lumborum, right internal hip rotators, left internal hip rotators, right quadriceps and left quadriceps.  These muscles were found to be within normal limits except for right hip flexor, right piriformis and right gluteus medius that exhibited limited  flexibility and were hypertonic at rest.    (Hip)  Range of motion is within normal limits.    Assessment:  1. Mechanical low back pain    2. Somatic dysfunction of sacroiliac joint          Plan:  Following brief reassessment of joint function and muscular tonicity,  Erum was treated with sacral side posture manipulation to improve function and passive range of motion of facet joints noted. Prior to manipulative therapies, she was treated with contract /relax to right hip flexor and brief soft tissue mobilization to muscles noted as taut in objective findings to improve flexibility and decrease strain to associated spinal structures.      Goal of care is to improve muscular and skeletal function and provide symptom relief. Erum will return Wednesday prior to going on vacation at the end of this week, for care if needed. Total exam and treatment time was 15 minutes.    The documentation recorded accurately and completely reflects the service(s), I performed and decisions made by me, Ila Campuzano D.C.

## 2020-07-17 NOTE — DISCHARGE INSTRUCTIONS
Please make an appointment to follow up with Your Primary Care Provider in 2-3 days if not improving.    Your tetanus shot is up to date    If Chino has discomfort from fever or other pain, she can have:  Acetaminophen (Tylenol) every 6 hours as needed. Her dose is:    2 regular strength tabs (650 mg)                                     (43.2+ kg/96+ lb)    NOTE: If your acetaminophen (Tylenol) came with a dropper marked with 0.4 and 0.8 ml, call us ([phone]) or check with your doctor about the dose before using it.     AND/OR      Ibuprofen (Advil, Motrin) every 6 hours as needed. His/her dose is:    2 regular strength tabs (400 mg)                                                                         (40-60 kg/ lb)

## 2020-08-03 ENCOUNTER — HOSPITAL ENCOUNTER (EMERGENCY)
Facility: CLINIC | Age: 20
Discharge: HOME | End: 2020-08-03
Attending: PSYCHIATRY & NEUROLOGY | Admitting: PSYCHIATRY & NEUROLOGY
Payer: COMMERCIAL

## 2020-08-03 VITALS — TEMPERATURE: 97.5 F | OXYGEN SATURATION: 100 %

## 2020-08-03 DIAGNOSIS — F11.10 OPIATE ABUSE, EPISODIC (H): ICD-10-CM

## 2020-08-03 PROCEDURE — 99283 EMERGENCY DEPT VISIT LOW MDM: CPT | Performed by: PSYCHIATRY & NEUROLOGY

## 2020-08-03 PROCEDURE — 99284 EMERGENCY DEPT VISIT MOD MDM: CPT | Mod: Z6 | Performed by: PSYCHIATRY & NEUROLOGY

## 2020-08-03 RX ORDER — CLONIDINE HYDROCHLORIDE 0.1 MG/1
0.1 TABLET ORAL 2 TIMES DAILY
Qty: 10 TABLET | Refills: 0 | Status: SHIPPED | OUTPATIENT
Start: 2020-08-03 | End: 2022-08-05

## 2020-08-03 RX ORDER — HYDROXYZINE HYDROCHLORIDE 25 MG/1
25 TABLET, FILM COATED ORAL 3 TIMES DAILY PRN
Qty: 10 TABLET | Refills: 0 | Status: ON HOLD | OUTPATIENT
Start: 2020-08-03 | End: 2022-08-05

## 2020-08-03 ASSESSMENT — ENCOUNTER SYMPTOMS
HYPERACTIVE: 0
CARDIOVASCULAR NEGATIVE: 1
EYES NEGATIVE: 1
PSYCHIATRIC NEGATIVE: 1
NEUROLOGICAL NEGATIVE: 1
RESPIRATORY NEGATIVE: 1
HALLUCINATIONS: 0
GASTROINTESTINAL NEGATIVE: 1
MUSCULOSKELETAL NEGATIVE: 1
CONSTITUTIONAL NEGATIVE: 1

## 2020-08-03 NOTE — ED AVS SNAPSHOT
[facility], [place], Emergency Department  [address]  [facility] [address]  Phone:  [phone]  Fax:  [phone]                                    Chino Ken   MRN: [id number]    Department:  [facility], Emergency Department   Date of Visit:  8/3/2020           After Visit Summary Signature Page    I have received my discharge instructions, and my questions have been answered. I have discussed any challenges I see with this plan with the nurse or doctor.    ..........................................................................................................................................  Patient/Patient Representative Signature      ..........................................................................................................................................  Patient Representative Print Name and Relationship to Patient    ..................................................               ................................................  Date                                   Time    ..........................................................................................................................................  Reviewed by Signature/Title    ...................................................              ..............................................  Date                                               Time          22EPIC Rev 08/18

## 2020-08-03 NOTE — DISCHARGE INSTRUCTIONS
I am glad you are seeking help for your opiate problem. You are referred to our [facility] for further evaluation/intervention with Suboxone and treatment evaluation. You have an appointment scheduled for this Wednesday (8/5/2020) at 4 PM in the clinic.  Trial of clonidine and hydroxyzine to help manage withdrawal until you are seen in the [facility]

## 2020-08-03 NOTE — ED PROVIDER NOTES
ED Provider Note  [facility]      History     Chief Complaint   Patient presents with     Addiction Problem     opiate addiction; unknown quantity but usuall 1/2 pill fentanyl a day     HPI  Chino Ken is a 19 year old female who is here accompanied by her cousin. Patient was seen at [facility] today seeking help for her opiate abuse problem and was referred her to get on Suboxone. Patient admits to using street-bought opiates for 2 years. She reports using episodically and now has trouble stopping due to withdrawal and cravings. She has used percocets and fentanyl-laced percocets and is concerned for her growing tolerance. She last used 1/2 tablet of fentanyl yesterday. She reports having cravings to use more but denies florid withdrawal symptoms. She does not exhibit withdrawal discomfort. She denies using other drugs. She came here seeking to get on Suboxone. She also has thoughts of going into CD treatment. She is not suicidal nor exhibit altered mental status.    Past Medical History  History reviewed. No pertinent past medical history.  History reviewed. No pertinent surgical history.  cetirizine (ZYRTEC ALLERGY) 10 MG tablet  hydrOXYzine (ATARAX) 50 MG tablet  sertraline (ZOLOFT) 25 MG tablet      No Known Allergies  Past medical history, past surgical history, medications, and allergies were reviewed with the patient.     Family History  No family history on file.  Family history was reviewed with the patient.     Social History  Social History     Tobacco Use     Smoking status: Never Smoker     Smokeless tobacco: Never Used   Substance Use Topics     Alcohol use: Never     Frequency: Never     Drug use: Never      Social history was reviewed with the patient.     Review of Systems   Constitutional: Negative.    HENT: Negative.    Eyes: Negative.    Respiratory: Negative.    Cardiovascular: Negative.    Gastrointestinal: Negative.    Genitourinary: Negative.     Musculoskeletal: Negative.    Neurological: Negative.    Psychiatric/Behavioral: Negative.  Negative for hallucinations and suicidal ideas. The patient is not hyperactive.    All other systems reviewed and are negative.        Physical Exam   Temp: 97.5  F (36.4  C)  SpO2: 100 %  Physical Exam  Vitals signs and nursing note reviewed.   HENT:      Head: Normocephalic.      Nose: Nose normal.      Mouth/Throat:      Mouth: Mucous membranes are moist.   Eyes:      Pupils: Pupils are equal, round, and reactive to light.   Neck:      Musculoskeletal: Normal range of motion.   Cardiovascular:      Rate and Rhythm: Normal rate.   Pulmonary:      Effort: Pulmonary effort is normal.   Abdominal:      General: Abdomen is flat.   Musculoskeletal: Normal range of motion.   Skin:     General: Skin is warm.   Neurological:      General: No focal deficit present.      Mental Status: She is alert.   Psychiatric:         Attention and Perception: Attention and perception normal. She does not perceive auditory or visual hallucinations.         Mood and Affect: Mood and affect normal.         Speech: Speech normal.         Behavior: Behavior normal. Behavior is not agitated, aggressive, hyperactive or combative. Behavior is cooperative.         Thought Content: Thought content normal. Thought content is not paranoid or delusional. Thought content does not include homicidal or suicidal ideation.         Cognition and Memory: Cognition and memory normal.         Judgment: Judgment normal.         ED Course      Procedures             No results found for any visits on 08/03/20.  Medications - No data to display     Assessments & Plan (with Medical Decision Making)   Patient with opiate abuse who feels her use has gotten out of control and now seeks help with stopping. She does not need community detox. She agrees to being referred to the [facility] for further evaluation and intervention and likely getting on Suboxone. Patient  was set up with an appointment for the [facility] on 8/5/2020 at 4 PM. Patient was given a limited prescription for clonidine and hydroxyzine. She does not show withdrawal discomfort/symptoms that warrants administering Suboxone here in the ED.    I have reviewed the nursing notes. I have reviewed the findings, diagnosis, plan and need for follow up with the patient.    New Prescriptions    No medications on file       Final diagnoses:   Opiate abuse, episodic (H)       --  Tr Cyr MD   Emergency Medicine   [facility], EMERGENCY DEPARTMENT  8/3/2020     Tr Cyr MD  08/03/20 2003

## 2020-08-05 ENCOUNTER — TELEPHONE (OUTPATIENT)
Dept: ADDICTION MEDICINE | Facility: CLINIC | Age: 20
End: 2020-08-05

## 2020-08-05 NOTE — TELEPHONE ENCOUNTER
Attempted to call Chino to request she come for appt today with Bety Tena at 3:30pm instead of 4pm, if possible. No answer; mailbox full. Unable to leave message.    Jelena Kapadia RN on 8/5/2020 at 11:49 AM

## 2020-08-05 NOTE — TELEPHONE ENCOUNTER
Per Dr Rico, patient's appt today at 4pm has been rescheduled to Dr Rico, instead of Bety Tena.    Jelena Kapadia RN on 8/5/2020 at 12:36 PM     be washed thoroughly with soap and water. Clean all high-touch surfaces everyday  High touch surfaces include counters, tabletops, doorknobs, bathroom fixtures, toilets, phones, keyboards, tablets, and bedside tables. Also, clean any surfaces that may have blood, stool, or body fluids on them. Use a household cleaning spray or wipe, according to the label instructions. Labels contain instructions for safe and effective use of the cleaning product including precautions you should take when applying the product, such as wearing gloves and making sure you have good ventilation during use of the product. Monitor your symptoms  Seek prompt medical attention if your illness is worsening (e.g., difficulty breathing). Before seeking care, call your healthcare provider and tell them that you have, or are being evaluated for, COVID-19. Put on a facemask before you enter the facility. These steps will help the healthcare providers office to keep other people in the office or waiting room from getting infected or exposed. Ask your healthcare provider to call the local or [facility] health department. Persons who are placed under active monitoring or facilitated self-monitoring should follow instructions provided by their local health department or occupational health professionals, as appropriate. When working with your local health department check their available hours. If you have a medical emergency and need to call 911, notify the dispatch personnel that you have, or are being evaluated for COVID-19. If possible, put on a facemask before emergency medical services arrive. Discontinuing home isolation  Patients with confirmed COVID-19 should remain under home isolation precautions until the risk of secondary transmission to others is thought to be low.  The decision to discontinue home isolation precautions should be made on a case-by-case basis, in consultation with healthcare providers and state and [facility] health departments. [employer] allows you to send messages to your doctor, view your test results, renew your prescriptions, schedule appointments, view visit notes, and more. How Do I Sign Up? 1. In your Internet browser, go to [email or web address]  2. Click on the Sign Up Now link in the Sign In box. You will see the New Member Sign Up page. 3. Enter your [employer] Access Code exactly as it appears below. You will not need to use this code after youve completed the sign-up process. If you do not sign up before the expiration date, you must request a new code. [employer] Access Code: Activation code not generated  Current [employer] Status: Active    4. Enter your Social Security Number ([id number]) and Date of Birth (mm/dd/yyyy) as indicated and click Submit. You will be taken to the next sign-up page. 5. Create a [employer] ID. This will be your [employer] login ID and cannot be changed, so think of one that is secure and easy to remember. 6. Create a [employer] password. You can change your password at any time. 7. Enter your Password Reset Question and Answer. This can be used at a later time if you forget your password. 8. Enter your e-mail address. You will receive e-mail notification when new information is available in [address]. 9. Click Sign Up. You can now view your medical record. Additional Information  If you have questions, please contact the physician practice where you receive care. Remember, [employer] is NOT to be used for urgent needs. For medical emergencies, dial 911. For questions regarding your [employer] account call [phone]. If you have a clinical question, please call your doctor's office.

## 2022-08-01 ENCOUNTER — TRANSFERRED RECORDS (OUTPATIENT)
Dept: HEALTH INFORMATION MANAGEMENT | Facility: CLINIC | Age: 22
End: 2022-08-01

## 2022-08-01 ENCOUNTER — HOSPITAL ENCOUNTER (INPATIENT)
Facility: CLINIC | Age: 22
LOS: 2 days | Discharge: HOME | End: 2022-08-05
Attending: PSYCHIATRY & NEUROLOGY | Admitting: PSYCHIATRY & NEUROLOGY
Payer: COMMERCIAL

## 2022-08-01 ENCOUNTER — MEDICAL CORRESPONDENCE (OUTPATIENT)
Dept: MEDSURG UNIT | Facility: CLINIC | Age: 22
End: 2022-08-01

## 2022-08-01 ENCOUNTER — TELEPHONE (OUTPATIENT)
Dept: BEHAVIORAL HEALTH | Facility: CLINIC | Age: 22
End: 2022-08-01

## 2022-08-01 DIAGNOSIS — F29 PSYCHOSIS, UNSPECIFIED PSYCHOSIS TYPE (H): ICD-10-CM

## 2022-08-01 DIAGNOSIS — F19.10 POLYSUBSTANCE ABUSE (H): ICD-10-CM

## 2022-08-01 DIAGNOSIS — F31.2 SEVERE MANIC BIPOLAR 1 DISORDER WITH PSYCHOTIC BEHAVIOR (H): ICD-10-CM

## 2022-08-01 DIAGNOSIS — Z11.52 ENCOUNTER FOR SCREENING LABORATORY TESTING FOR SEVERE ACUTE RESPIRATORY SYNDROME CORONAVIRUS 2 (SARS-COV-2): ICD-10-CM

## 2022-08-01 DIAGNOSIS — F31.10 BIPOLAR AFFECTIVE DISORDER, CURRENT EPISODE MANIC (H): ICD-10-CM

## 2022-08-01 DIAGNOSIS — Z51.81 ENCOUNTER FOR THERAPEUTIC DRUG MONITORING: Primary | ICD-10-CM

## 2022-08-01 DIAGNOSIS — F31.2 BIPOLAR AFFECTIVE DISORDER, CURRENTLY MANIC, SEVERE, WITH PSYCHOTIC FEATURES (H): ICD-10-CM

## 2022-08-01 LAB
AMPHETAMINES UR QL SCN: ABNORMAL
BARBITURATES UR QL: ABNORMAL
BENZODIAZ UR QL: ABNORMAL
CANNABINOIDS UR QL SCN: ABNORMAL
COCAINE UR QL: ABNORMAL
HCG UR QL: NEGATIVE
OPIATES UR QL SCN: ABNORMAL
SARS-COV-2 RNA RESP QL NAA+PROBE: NEGATIVE

## 2022-08-01 PROCEDURE — 99285 EMERGENCY DEPT VISIT HI MDM: CPT | Mod: 25

## 2022-08-01 PROCEDURE — 250N000013 HC RX MED GY IP 250 OP 250 PS 637: Performed by: PSYCHIATRY & NEUROLOGY

## 2022-08-01 PROCEDURE — C9803 HOPD COVID-19 SPEC COLLECT: HCPCS

## 2022-08-01 PROCEDURE — 90791 PSYCH DIAGNOSTIC EVALUATION: CPT

## 2022-08-01 PROCEDURE — 87635 SARS-COV-2 COVID-19 AMP PRB: CPT | Performed by: PSYCHIATRY & NEUROLOGY

## 2022-08-01 PROCEDURE — 80307 DRUG TEST PRSMV CHEM ANLYZR: CPT | Performed by: PSYCHIATRY & NEUROLOGY

## 2022-08-01 PROCEDURE — 99285 EMERGENCY DEPT VISIT HI MDM: CPT | Performed by: PSYCHIATRY & NEUROLOGY

## 2022-08-01 PROCEDURE — 81025 URINE PREGNANCY TEST: CPT | Performed by: PSYCHIATRY & NEUROLOGY

## 2022-08-01 RX ORDER — OLANZAPINE 10 MG/1
10 TABLET, ORALLY DISINTEGRATING ORAL ONCE
Status: COMPLETED | OUTPATIENT
Start: 2022-08-01 | End: 2022-08-01

## 2022-08-01 RX ADMIN — OLANZAPINE 10 MG: 10 TABLET, ORALLY DISINTEGRATING ORAL at 17:46

## 2022-08-01 ASSESSMENT — ENCOUNTER SYMPTOMS
NEUROLOGICAL NEGATIVE: 1
NERVOUS/ANXIOUS: 1
ACTIVITY CHANGE: 1
EYES NEGATIVE: 1
HYPERACTIVE: 0
RESPIRATORY NEGATIVE: 1
SLEEP DISTURBANCE: 1
HALLUCINATIONS: 1
DECREASED CONCENTRATION: 1
MUSCULOSKELETAL NEGATIVE: 1
CARDIOVASCULAR NEGATIVE: 1
GASTROINTESTINAL NEGATIVE: 1
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
## 2022-08-01 NOTE — ED PROVIDER NOTES
ED Provider Note  [facility]      History     Chief Complaint   Patient presents with     Manic Behavior     HPI  Chino Ken is a 21 year old female who is here brought in by father with concerns that she has not been sleeping for 4 days and now appears manic and disorganized. She has history of polysubstance abuse and previously was hospitalized at [facility] a year ago where she was stabilized and sent to MI/CD treatment. She was diagnosed with bipolar disorder. She did well and returned home, was working and functioning appropriately.    Patient told her father that she had relapsed on drugs 2 weeks ago and now presents again with evonne and psychosis. She admits to having visual and auditory hallucinations and thoughts of harm to others and self. Her affect is incongruent with her disclosed thoughts of harm however as she appears elevated in mood. She tracks poorly.    Patient denies acute medical concerns. She denies COVID symptoms.    Please see DEC Crisis Assessment on 8/1/22 in Epic for further details.    PERSONAL MEDICAL HISTORY  No past medical history on file.  PAST SURGICAL HISTORY  No past surgical history on file.  FAMILY HISTORY  No family history on file.  SOCIAL HISTORY  Social History     Tobacco Use     Smoking status: Never Smoker     Smokeless tobacco: Never Used   Substance Use Topics     Alcohol use: Never     MEDICATIONS  Current Facility-Administered Medications   Medication     OLANZapine zydis (zyPREXA) ODT tab 10 mg     Current Outpatient Medications   Medication     cetirizine (ZYRTEC ALLERGY) 10 MG tablet     cloNIDine (CATAPRES) 0.1 MG tablet     hydrOXYzine (ATARAX) 25 MG tablet     hydrOXYzine (ATARAX) 50 MG tablet     sertraline (ZOLOFT) 25 MG tablet     ALLERGIES  No Known Allergies       Review of Systems   Constitutional: Positive for activity change.   HENT: Negative.    Eyes: Negative.    Respiratory: Negative.    Cardiovascular: Negative.     Gastrointestinal: Negative.    Genitourinary: Negative.    Musculoskeletal: Negative.    Neurological: Negative.    Psychiatric/Behavioral: Positive for behavioral problems, decreased concentration, hallucinations, sleep disturbance and suicidal ideas. The patient is nervous/anxious. The patient is not hyperactive.    All other systems reviewed and are negative.        Physical Exam   BP: 125/88  Pulse: 66  Temp: 97.3  F (36.3  C)  Resp: 16  SpO2: 100 %  Physical Exam  Vitals and nursing note reviewed.   HENT:      Head: Normocephalic.   Eyes:      Pupils: Pupils are equal, round, and reactive to light.   Pulmonary:      Effort: Pulmonary effort is normal.   Musculoskeletal:         General: Normal range of motion.      Cervical back: Normal range of motion.   Neurological:      General: No focal deficit present.      Mental Status: She is alert.   Psychiatric:         Attention and Perception: Perception normal. She is inattentive. She does not perceive auditory or visual hallucinations.         Mood and Affect: Mood is elated. Affect is inappropriate.         Speech: Speech is tangential.         Behavior: Behavior normal. Behavior is not agitated, aggressive, hyperactive or combative. Behavior is cooperative.         Thought Content: Thought content is not delusional. Thought content includes suicidal ideation. Thought content does not include homicidal ideation.         Cognition and Memory: Cognition and memory normal.         Judgment: Judgment is inappropriate.         ED Course      Procedures          Results for orders placed or performed during the hospital encounter of 08/01/22   HCG qualitative urine     Status: Normal   Result Value Ref Range    hCG Urine Qualitative Negative Negative   Drug abuse screen 1 urine (ED)     Status: Abnormal   Result Value Ref Range    Amphetamines Urine Screen Negative Screen Negative    Barbiturates Urine Screen Negative Screen Negative    Benzodiazepines Urine Screen  Negative Screen Negative    Cannabinoids Urine Screen Positive (A) Screen Negative    Cocaine Urine Screen Negative Screen Negative    Opiates Urine Screen Negative Screen Negative   Urine Drugs of Abuse Screen     Status: Abnormal    Narrative    The following orders were created for panel order Urine Drugs of Abuse Screen.  Procedure                               Abnormality         Status                     ---------                               -----------         ------                     Drug abuse screen 1 urin...[[id number]]  Abnormal            Final result                 Please view results for these tests on the individual orders.     Medications   OLANZapine zydis (zyPREXA) ODT tab 10 mg (has no administration in time range)        Assessments & Plan (with Medical Decision Making)   Patient is here for a mental health assessment. She has history of polysubstance abuse and psychosis consistent with evonne and diagnosed with bipolar disorder. She did well when sober but relapsed 2 weeks ago and now has decompensated. She has not been sleeping for 4 days. She is disorganized and has mood swings, currently in a mixed phase as she also has thoughts of suicidal and harming others. She would benefit from admission and is voluntary. She is referred and is holdable if she changes her mind.    Patient slept and felt better when she woke up and was asking to leave. She is placed on a 72 hour hold.    I have reviewed the nursing notes. I have reviewed the findings, diagnosis, plan and need for follow up with the patient.    New Prescriptions    No medications on file       Final diagnoses:   Psychosis, unspecified psychosis type (H)   Polysubstance abuse (H)       --  Tr Cyr MD  [facility] EMERGENCY DEPARTMENT  8/1/2022     Tr Cyr MD  08/01/22 0541       Tr Cyr MD  08/01/22 [id number]

## 2022-08-01 NOTE — SAFE
"Chino Ken  August 1, 2022  SAFE Note    Critical Safety Issues:  Patient is reporting suicidal ideation with plan to \"stab myself in the heart\" and reported she also wants to \"murder everybody\". She reported she is using drugs and alcohol and reported she is on \"everything\". Patient is positive for THC in her urine drug screen. Patient is experiencing labile mood and is observed responding to internal stimuli while reporting she is hearing voices and seeing people. Patient has historical diagnoses of Bipolar Disorder with psychotic features.       Current Suicidal Ideation/Self-Injurious Concerns/Methods: Cutting      Current or Historical Inappropriate Sexual Behavior: No      Current or Historical Aggression/Homicidal Ideation: Agitation/Hyperactivity-has history of destroying the house.      Triggers: Patient did not indicate any specific triggers.    Guardianship Status: Chino is her own guardian.. Guardianship paperwork is not required.    This patient is a child/adolescent: No    This patient has additional special visitor precautions: No    Updated care team: Yes: Tr Cyr MD    For additional details see full LM assessment.       Natalie Fritsch, [facility]        "

## 2022-08-01 NOTE — ED TRIAGE NOTES
"\"I think I took a yellow typhoid shot\". Has not slept in 4 nights per father, hyperverbal an manic appearing in triage.      "

## 2022-08-01 NOTE — TELEPHONE ENCOUNTER
S: 5:56 pm Pt is a 21 yr old fem in [facility] for psychosis report by Jaja SHANKAR: Pt reports command AH telling her to harm herself and other.  VH.  SI w/ plan to stab herself.  HI - kill everyone.  Utox pos for marijuana.  Pt reports using all substances.  Pt reports decreased sleep.   reports pt is crying and then laughing.  Hx of bipolar.  COVID collected.     A: vol     R: Pt waiting in ED for appropriate placement.  [facility] only at this time.  COVID collected.     Patient cleared and ready for behavioral bed placement: Yes

## 2022-08-02 PROCEDURE — 250N000013 HC RX MED GY IP 250 OP 250 PS 637: Performed by: EMERGENCY MEDICINE

## 2022-08-02 PROCEDURE — 96372 THER/PROPH/DIAG INJ SC/IM: CPT | Performed by: EMERGENCY MEDICINE

## 2022-08-02 PROCEDURE — 250N000011 HC RX IP 250 OP 636: Performed by: EMERGENCY MEDICINE

## 2022-08-02 RX ORDER — OLANZAPINE 10 MG/1
10 TABLET, ORALLY DISINTEGRATING ORAL ONCE
Status: COMPLETED | OUTPATIENT
Start: 2022-08-02 | End: 2022-08-02

## 2022-08-02 RX ORDER — LORAZEPAM 2 MG/ML
2 INJECTION INTRAMUSCULAR ONCE
Status: COMPLETED | OUTPATIENT
Start: 2022-08-02 | End: 2022-08-02

## 2022-08-02 RX ORDER — OLANZAPINE 10 MG/1
10 TABLET, ORALLY DISINTEGRATING ORAL 3 TIMES DAILY
Status: DISCONTINUED | OUTPATIENT
Start: 2022-08-02 | End: 2022-08-03

## 2022-08-02 RX ORDER — OLANZAPINE 10 MG/2ML
10 INJECTION, POWDER, FOR SOLUTION INTRAMUSCULAR ONCE
Status: COMPLETED | OUTPATIENT
Start: 2022-08-02 | End: 2022-08-02

## 2022-08-02 RX ORDER — HALOPERIDOL 5 MG/ML
5 INJECTION INTRAMUSCULAR ONCE
Status: COMPLETED | OUTPATIENT
Start: 2022-08-02 | End: 2022-08-02

## 2022-08-02 RX ADMIN — OLANZAPINE 10 MG: 10 TABLET, ORALLY DISINTEGRATING ORAL at 07:55

## 2022-08-02 RX ADMIN — OLANZAPINE 10 MG: 10 TABLET, ORALLY DISINTEGRATING ORAL at 20:04

## 2022-08-02 RX ADMIN — LORAZEPAM 2 MG: 2 INJECTION INTRAMUSCULAR at 13:04

## 2022-08-02 RX ADMIN — HALOPERIDOL LACTATE 5 MG: 5 INJECTION, SOLUTION INTRAMUSCULAR at 13:04

## 2022-08-02 RX ADMIN — OLANZAPINE 10 MG: 10 INJECTION, POWDER, LYOPHILIZED, FOR SOLUTION INTRAMUSCULAR at 10:25
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
## 2022-08-02 NOTE — ED NOTES
Pt became very upset when she wasn't able to leave per her demands and threatened to break everything in the [facility].  Writer called for security to be present and informed charge nurse.  Pt threw her shoes and sat on the floor by the door and banged her head against the door and slammed another door.  Pt was yelling.  Pt eventually calmed herself when security arrived and was willing to go back to the ED with security and code green called.  Pt apologized to staff and left cooperatively.

## 2022-08-02 NOTE — TELEPHONE ENCOUNTER
0611 Bed Search Update:    [facility]-No beds available  [facility]-No beds available  [facility]-No beds available  [facility]-No beds available  [facility]-No beds available  [facility]-No beds available  [facility]-No beds available  [facility]-No beds available  [facility]-No beds available  [facility]-Low acuity only.  Mixed unit adol/adult/mariusz  [facility]-No beds available  [facility]-No beds available  [facility]-Low acuity  [facility]-No beds available  [facility]-No beds available  [facility]-No beds available  [facility]-No beds available.  Must be on a 72 HH. No intake after 10 PM.  [facility]-No beds available  [facility]-Voluntary/[place] only. No hx violence or sexual assault.  [facility]-No beds available  [facility]-No beds available  [facility]-No recent hx violence/aggression. Must be able to program in groups.  [facility]-Low acuity only.  [facility].  No recent violence or aggression.  [facility]-No beds available  [facility]-No beds available  [facility] s-[id number] No answer  [facility]-No beds available    Remains on wait list.

## 2022-08-02 NOTE — ED NOTES
Patient slept on and off during the night. Asking about seeing the doctor and speaking to her parents.  Able to be redirected and calm.

## 2022-08-02 NOTE — ED NOTES
Pt is hyperactive this morning, pleasant but hard to redirect.  Writer called provider for prn.  Zyprexa was given.

## 2022-08-02 NOTE — ED NOTES
Within an hour after restraint an in person face to face assessment was completed at 1:57pm, including an evaluation of the patient's immediate reaction to the intervention, behavioral assessment and review/assessment of history, drugs and medications, recent labs, etc., and behavioral condition.  The patient experienced: No adverse physical outcome from seclusion/restraint initiation.  The intervention of restraint or seclusion needs to terminate.    Signed:  Shira Baeza MD  August 2, 2022 at 1:57 PM           Shira Baeza MD  08/02/22 2119

## 2022-08-02 NOTE — TELEPHONE ENCOUNTER
R: Pt currently at [facility], w/ [facility] Bed Call Log 8/2/2022 done at 8am    Adults:              [facility] is posting 0 beds.              [facility] is posting 0 beds.              [facility] is posting 0 beds.              [facility] is posting 0 beds.              [facility] is posting 0 beds.              [facility] is posting 0 beds.              [facility] is posting 0 beds.              [facility] is posting 0 beds.                   [facility] is posting 1 beds. Mixed unit 12+. Low acuity only. Meets exclusionary criteria              [facility] is positing 0 beds. No aggression.              [facility] is posting 0 beds.              [facility] is posting 2 beds. Low acuity only. Meets exclusionary criteria              [facility] is posting 0 beds.              [facility] is posting 0 beds. Low acuity.              [facility] is posting 1 beds. 72 hr hold required. Meets exclusionary criteria, per Gareth Low acuity only              [facility] is posting 0 beds.                   [facility] is posting 0 beds. Vol only, No Hx of aggression, violence or assault. No sexual offenders. No 72 hr holds.              [facility] is posting 7 beds.  (Must have the cognitive ability to do programming. No aggressive or violent behavior or recent HX in the last 2 yrs. MH must be primary.) Meets exclusionary criteria              [facility] is posting 5 beds. Low acuity only. Violence and aggression capped. Meets exclusionary criteria              [facility] is posting 0 beds. Low acuity, Neg Covid.              [facility] is posting 2 beds. Covid neg. Vol only. Combined adolescent and adult unit. No aggressive or violent behavior. No registered sex offenders. Meets exclusionary criteria              [facility] is posting 2 beds. Call for details.              [facility] is posting 4 beds.    8:25a Intake called [facility] (Radha) to inquire about beds available. [facility] informed they do not have any high acuity beds available at this time and pt's HI towards others excludes them for low acuity beds. [facility] informed to call back around 11:00a this morning for update on high acuity beds.     8:48a Intake called [facility] Nurse (Millie) to request update on pt. Nurse informed that pt is still manic but pleasant, calm and tries to cooperate but hard to redirect, repetitive with questions.    11:19a Intake called [facility] (Marjan)  to inquire about beds available. Pt needs High Acuity beds and they cannot accommodate at this time. [facility] informed to call after 4pm today or tomorrow morning.     11:53a Intake called [facility] (Gareth) to inquire about beds available. Low acuity and on a 72HH at this time. Pt is declined due to acuity.    3:25p Intake called [facility] (Guadalupe) to inquire about high acuity beds available. [facility] has no high acuity beds at this time.    [facility] Access Inpatient Bed Call Log 8/2/2022 done at 2:55p         Adults:              [facility] is posting 0 beds.              [facility] is posting 0 beds.              [facility] is posting 0 beds.              [facility] is posting 0 beds.              [facility] is posting 0 beds.              [facility] is posting 0 beds.              [facility] is posting 0 beds.              [facility] is posting 0 beds.                   [facility] is posting 1 bed. Mixed unit 12+. Low acuity only.              [facility] is positing 0 beds. No aggression.              [facility] is posting 0 beds.              [facility] is posting 1 bed. Low acuity only.              [facility] is posting 0 beds.              [facility] is posting 0 beds. Low acuity.              [facility] beds. 72 hr hold required.              [facility] is posting 0 beds.                    [facility] is posting 0 beds. Vol only, No Hx of aggression, violence or assault. No sexual offenders. No 72 hr holds.              [facility] is posting 7 beds.  (Must have the cognitive ability to do programming. No aggressive or violent behavior or recent HX in the last 2 yrs. MH must be primary.)              [facility] is posting 0 beds. Low acuity only. Violence and aggression capped.              [facility] is posting 0 beds. Low acuity, Neg Covid.              [facility] is posting 3 beds. Covid neg. Vol only. Combined adolescent and adult unit. No aggressive or violent behavior. No registered sex offenders.              [facility] is posting 8 beds. Call for details.              [facility] is posting 4 beds.    No appropriate beds available.

## 2022-08-02 NOTE — ED PROVIDER NOTES
[facility] ED Mental Health Handoff Note:       Brief HPI:  This is a 21 year old female signed out to me by Dr. Oh.  See initial ED Provider note for full details of the presentation. Interval history is pertinent for intermittent episodes of agitation and aggression.  Patient had 1 episode this morning while she was in the behavioral health ER.  She received 1 dose of oral Zyprexa which did help her calm down.  After about an hour and a half she again became agitated.  We came to the ER spoke to her and she was very emotional and crying.  She was time that she wanted to go home and be with her family.  Says that the medication did help and she was willing to take a second dose of medications.  Patient was able to be calmed down and did take an IM injection of Zyprexa.  Patient was able to sleep for couple hours and then again became agitated and angry.  She did not threatening the staff.  Patient at this time was placed in seclusion.  She was given a IM injection of Haldol.  Per discussion with Dr. Cyr the psychiatrist in the ER he recommended that she get scheduled Zyprexa and Haldol intermittently as needed for increased agitation.  Patient has been boarding in the ER for 23 hours..    Home meds reviewed and ordered/administered: Yes    Medically stable for inpatient mental health admission: Yes.    Evaluated by mental health: Yes. The recommendation is for inpatient mental health treatment. Bed search in process    Safety concerns: At the time I received sign out, the patient had been aggressive/combative/agitated, but has calmed.    Hold Status:  Active Orders   Legal    Emergency Hospitalization Hold (72 Hr Hold)     Frequency: Effective Now     Start Date/Time: 08/01/22 2100      Number of Occurrences: Until Specified     Order Comments: Please is psychotic and impaired in her thinking and unable to care for self              Exam:   Patient Vitals for the past 24 hrs:   BP Temp Temp src Pulse  Resp SpO2   08/02/22 0801 122/79 -- -- 81 16 99 %   08/01/22 2345 120/82 97.4  F (36.3  C) Oral 70 16 99 %   08/01/22 1449 125/88 97.3  F (36.3  C) Oral 66 16 100 %           ED Course:    Medications   OLANZapine zydis (zyPREXA) ODT tab 10 mg (has no administration in time range)   OLANZapine zydis (zyPREXA) ODT tab 10 mg (10 mg Oral Given 8/1/22 1746)   OLANZapine zydis (zyPREXA) ODT tab 10 mg (10 mg Oral Given 8/2/22 0755)   OLANZapine (zyPREXA) injection 10 mg (10 mg Intramuscular Given 8/2/22 1025)   haloperidol lactate (HALDOL) injection 5 mg (5 mg Intramuscular Given 8/2/22 1304)   LORazepam (ATIVAN) injection 2 mg (2 mg Intramuscular Given 8/2/22 1304)            There were significant events during my shift.  Patient had 1 code 21 with security alert.  Patient was placed in seclusion and given an IM injection of Haldol.    Patient was signed out to the oncoming provider.       Impression:    ICD-10-CM    1. Psychosis, unspecified psychosis type (H)  F29    2. Polysubstance abuse (H)  F19.10        Plan:    1. Awaiting mental health evaluation/recommendations.      RESULTS:   Results for orders placed or performed during the hospital encounter of 08/01/22 (from the past 24 hour(s))   Urine Drugs of Abuse Screen     Status: Abnormal    Collection Time: 08/01/22  3:12 PM    Narrative    The following orders were created for panel order Urine Drugs of Abuse Screen.  Procedure                               Abnormality         Status                     ---------                               -----------         ------                     Drug abuse screen 1 urin...[[id number]]  Abnormal            Final result                 Please view results for these tests on the individual orders.   HCG qualitative urine     Status: Normal    Collection Time: 08/01/22  3:12 PM   Result Value Ref Range    hCG Urine Qualitative Negative Negative   Drug abuse screen 1 urine (ED)     Status: Abnormal    Collection Time:  08/01/22  3:12 PM   Result Value Ref Range    Amphetamines Urine Screen Negative Screen Negative    Barbiturates Urine Screen Negative Screen Negative    Benzodiazepines Urine Screen Negative Screen Negative    Cannabinoids Urine Screen Positive (A) Screen Negative    Cocaine Urine Screen Negative Screen Negative    Opiates Urine Screen Negative Screen Negative   Asymptomatic COVID-19 Virus (Coronavirus) by PCR Nasopharyngeal     Status: Normal    Collection Time: 08/01/22  5:46 PM    Specimen: Nasopharyngeal; Swab   Result Value Ref Range    SARS CoV2 PCR Negative Negative    Narrative    Testing was performed using the ngozi  SARS-CoV-2 & Influenza A/B Assay on the ngozi  [employer]  System.  This test should be ordered for the detection of SARS-COV-2 in individuals who meet SARS-CoV-2 clinical and/or epidemiological criteria. Test performance is unknown in asymptomatic patients.  This test is for in vitro diagnostic use under the FDA EUA for laboratories certified under CLIA to perform moderate and/or high complexity testing. This test has not been FDA cleared or approved.  A negative test does not rule out the presence of PCR inhibitors in the specimen or target RNA in concentration below the limit of detection for the assay. The possibility of a false negative should be considered if the patient's recent exposure or clinical presentation suggests COVID-19.  [facility] are certified under the Clinical Laboratory Improvement Amendments of 1988 (CLIA-88) as qualified to perform moderate and/or high complexity laboratory testing.             MD Felisha Hill Kruti Tripathi, MD  08/02/22 6347

## 2022-08-02 NOTE — ED NOTES
"Triage & Transition Services, Extended Care     Chino Ken  August 2, 2022    Chino is followed related to 72 Hour Hold: 8/1 @ 2100 - 8/4 @ 2100. Please see initial DEC Crisis Assessment completed for complete assessment information. Medical record is reviewed. While patient is in the ED, care team is working towards Demonstrate Calm, Non Violent/Destructive Behavior for at least 24 hours. Additional notes include per RN note at 917am, pt was informed that she could not leave the ED. She escalated, threatened to break everything in [facility], yelling and uncooperative. Code green was called and security came, [facility] ED.     This writer checked in with pt in ED 11. She was initially calm and cooperative, continued to present with hyperverbal speech, euphoric, labile, but did not appear to be attending to internal stimuli at time of check in. She displays little insight into her mental health sx. She states that she wants to \"become a therapist to help other people so that other people don't have to go through what I went through. I just want to do good for the world. I'm glad I'm talking to you. Where did you go to school? My arm feels tingly. I think I'm getting my strength back. I know I just need to learn to keep my mouth shut, that's what keeps getting me in trouble\". She states \"I feel like my whole life I got tricked. I'm scared in seclusion and that's what happened today. They tried to trap me in there. I haven't slept in 4 days\". She states that she wants to start seeing a therapist and using her coping skills such as \"toys, things that keep me calm\". She then requested to leave, this writer informed pt that the plan is for admission. She escalated and ended the check in, \"I don't want to talk to you anymore. Get out of here. Let me leave, I slept already. I need to just sleep at home. I'm fine. I'm being trapped\". Pt closed her door and ended conversation.     There are not significant " status changes.     Recommend inpatient admission.     Plan:  Plan is to continue searching for bed on  mental health unit. Pt continues to present with lability, hyperverbal and tangential speech, euphoria. She is on a 72hh that expires 8/4 at 2100pm.     Extended Care will follow and meet with patient/family/care team as able or requested.     Giovanna Mendoza  [facility], Extended Care   [phone]

## 2022-08-03 ENCOUNTER — TELEPHONE (OUTPATIENT)
Dept: BEHAVIORAL HEALTH | Facility: CLINIC | Age: 22
End: 2022-08-03

## 2022-08-03 PROBLEM — F29 PSYCHOSIS, UNSPECIFIED PSYCHOSIS TYPE (H): Status: ACTIVE | Noted: 2022-08-03

## 2022-08-03 PROCEDURE — 250N000013 HC RX MED GY IP 250 OP 250 PS 637: Performed by: EMERGENCY MEDICINE

## 2022-08-03 PROCEDURE — 250N000013 HC RX MED GY IP 250 OP 250 PS 637: Performed by: CLINICAL NURSE SPECIALIST

## 2022-08-03 PROCEDURE — 124N000002 HC R&B MH UMMC

## 2022-08-03 PROCEDURE — 250N000013 HC RX MED GY IP 250 OP 250 PS 637

## 2022-08-03 PROCEDURE — 99223 1ST HOSP IP/OBS HIGH 75: CPT | Mod: AI | Performed by: CLINICAL NURSE SPECIALIST

## 2022-08-03 PROCEDURE — 99207 PR NO BILLABLE SERVICE THIS VISIT: CPT

## 2022-08-03 RX ORDER — LITHIUM CARBONATE 450 MG
450 TABLET, EXTENDED RELEASE ORAL EVERY 12 HOURS SCHEDULED
Status: DISCONTINUED | OUTPATIENT
Start: 2022-08-03 | End: 2022-08-05 | Stop reason: HOSPADM

## 2022-08-03 RX ORDER — HYDROXYZINE HYDROCHLORIDE 25 MG/1
25-50 TABLET, FILM COATED ORAL EVERY 4 HOURS PRN
Status: DISCONTINUED | OUTPATIENT
Start: 2022-08-03 | End: 2022-08-05 | Stop reason: HOSPADM

## 2022-08-03 RX ORDER — LORAZEPAM 1 MG/1
1 TABLET ORAL 3 TIMES DAILY PRN
Status: DISCONTINUED | OUTPATIENT
Start: 2022-08-03 | End: 2022-08-05 | Stop reason: HOSPADM

## 2022-08-03 RX ORDER — DIPHENHYDRAMINE HYDROCHLORIDE 50 MG/ML
50 INJECTION INTRAMUSCULAR; INTRAVENOUS 3 TIMES DAILY PRN
Status: DISCONTINUED | OUTPATIENT
Start: 2022-08-03 | End: 2022-08-05 | Stop reason: HOSPADM

## 2022-08-03 RX ORDER — CETIRIZINE HYDROCHLORIDE 10 MG/1
10 TABLET ORAL DAILY
Status: DISCONTINUED | OUTPATIENT
Start: 2022-08-03 | End: 2022-08-05 | Stop reason: HOSPADM

## 2022-08-03 RX ORDER — OLANZAPINE 10 MG/1
10 TABLET, ORALLY DISINTEGRATING ORAL 3 TIMES DAILY
Status: DISCONTINUED | OUTPATIENT
Start: 2022-08-03 | End: 2022-08-03

## 2022-08-03 RX ORDER — MAGNESIUM HYDROXIDE/ALUMINUM HYDROXICE/SIMETHICONE 120; 1200; 1200 MG/30ML; MG/30ML; MG/30ML
30 SUSPENSION ORAL EVERY 4 HOURS PRN
Status: DISCONTINUED | OUTPATIENT
Start: 2022-08-03 | End: 2022-08-05 | Stop reason: HOSPADM

## 2022-08-03 RX ORDER — AMOXICILLIN 250 MG
2 CAPSULE ORAL DAILY PRN
Status: DISCONTINUED | OUTPATIENT
Start: 2022-08-03 | End: 2022-08-05 | Stop reason: HOSPADM

## 2022-08-03 RX ORDER — DIPHENHYDRAMINE HCL 50 MG
50 CAPSULE ORAL 3 TIMES DAILY PRN
Status: DISCONTINUED | OUTPATIENT
Start: 2022-08-03 | End: 2022-08-05 | Stop reason: HOSPADM

## 2022-08-03 RX ORDER — HALOPERIDOL 5 MG/1
5-10 TABLET ORAL 3 TIMES DAILY PRN
Status: DISCONTINUED | OUTPATIENT
Start: 2022-08-03 | End: 2022-08-05 | Stop reason: HOSPADM

## 2022-08-03 RX ORDER — OLANZAPINE 10 MG/2ML
10 INJECTION, POWDER, FOR SOLUTION INTRAMUSCULAR 3 TIMES DAILY PRN
Status: DISCONTINUED | OUTPATIENT
Start: 2022-08-03 | End: 2022-08-05 | Stop reason: HOSPADM

## 2022-08-03 RX ORDER — OLANZAPINE 5 MG/1
5-10 TABLET ORAL 3 TIMES DAILY PRN
Status: DISCONTINUED | OUTPATIENT
Start: 2022-08-03 | End: 2022-08-05 | Stop reason: HOSPADM

## 2022-08-03 RX ORDER — HALOPERIDOL 5 MG/ML
10 INJECTION INTRAMUSCULAR 3 TIMES DAILY PRN
Status: DISCONTINUED | OUTPATIENT
Start: 2022-08-03 | End: 2022-08-05 | Stop reason: HOSPADM

## 2022-08-03 RX ORDER — AMOXICILLIN 250 MG
1 CAPSULE ORAL AT BEDTIME
Status: DISCONTINUED | OUTPATIENT
Start: 2022-08-03 | End: 2022-08-03

## 2022-08-03 RX ORDER — ACETAMINOPHEN 325 MG/1
650 TABLET ORAL EVERY 4 HOURS PRN
Status: DISCONTINUED | OUTPATIENT
Start: 2022-08-03 | End: 2022-08-05 | Stop reason: HOSPADM

## 2022-08-03 RX ORDER — LORAZEPAM 2 MG/ML
1 INJECTION INTRAMUSCULAR 3 TIMES DAILY PRN
Status: DISCONTINUED | OUTPATIENT
Start: 2022-08-03 | End: 2022-08-05 | Stop reason: HOSPADM

## 2022-08-03 RX ORDER — OLANZAPINE 10 MG/1
10 TABLET, ORALLY DISINTEGRATING ORAL 2 TIMES DAILY
Status: COMPLETED | OUTPATIENT
Start: 2022-08-03 | End: 2022-08-04

## 2022-08-03 RX ORDER — TRAZODONE HYDROCHLORIDE 50 MG/1
50 TABLET, FILM COATED ORAL
Status: DISCONTINUED | OUTPATIENT
Start: 2022-08-03 | End: 2022-08-05 | Stop reason: HOSPADM

## 2022-08-03 RX ADMIN — LITHIUM CARBONATE 450 MG: 450 TABLET, EXTENDED RELEASE ORAL at 19:51

## 2022-08-03 RX ADMIN — OLANZAPINE 10 MG: 10 TABLET, ORALLY DISINTEGRATING ORAL at 19:51

## 2022-08-03 RX ADMIN — OLANZAPINE 10 MG: 10 TABLET, ORALLY DISINTEGRATING ORAL at 08:38

## 2022-08-03 RX ADMIN — SENNOSIDES AND DOCUSATE SODIUM 1 TABLET: 50; 8.6 TABLET ORAL at 20:07

## 2022-08-03 ASSESSMENT — ACTIVITIES OF DAILY LIVING (ADL)
ADLS_ACUITY_SCORE: 18
DIFFICULTY_EATING/SWALLOWING: NO
WEAR_GLASSES_OR_BLIND: NO
DOING_ERRANDS_INDEPENDENTLY_DIFFICULTY: NO
TOILETING_ISSUES: NO
DIFFICULTY_COMMUNICATING: NO
ADLS_ACUITY_SCORE: 18
HEARING_DIFFICULTY_OR_DEAF: NO
WALKING_OR_CLIMBING_STAIRS_DIFFICULTY: NO
ADLS_ACUITY_SCORE: 18
CONCENTRATING,_REMEMBERING_OR_MAKING_DECISIONS_DIFFICULTY: NO
CHANGE_IN_FUNCTIONAL_STATUS_SINCE_ONSET_OF_CURRENT_ILLNESS/INJURY: NO
ADLS_ACUITY_SCORE: 18
ADLS_ACUITY_SCORE: 18
FALL_HISTORY_WITHIN_LAST_SIX_MONTHS: NO
DRESSING/BATHING_DIFFICULTY: NO
ADLS_ACUITY_SCORE: 18

## 2022-08-03 NOTE — TELEPHONE ENCOUNTER
R:  No appropriate beds within [place].  Bed search update 9:15PM    [facility]: @ cap per website  [facility]:@ cap per website  [facility]: @ cap per website  [facility]: @ cap per website  [facility]: @ cap per website  [facility]: @ cap per website  [facility]: @ cap per website  [facility]: 1 low acuity bed  [facility]: @ cap per website  [facility]: @ cap per website  [facility]: @ cap per website  [facility]: 1 low acuity beds available  [facility]: @ cap per website  [facility]: @cap per website  [facility]: low acuity bed available-Called, no bed availability   [facility]: @ cap per website  [facility]: 6 available beds   :  available bed  [facility]: @ cap per website.  [facility]: 3 available beds.  No aggression or 72HH.  [facility]: 8 available beds.  Spoke to [facility], only low acuity beds available  [facility]: 2 available beds- Mixed unit.  Low to moderate acuity.  Unwilling to review due to thoughts of harming others  Pt remains on work list until appropriate placement is available

## 2022-08-03 NOTE — ED PROVIDER NOTES
[facility] ED Mental Health Handoff Note:       Brief HPI:  This is a 21 year old female signed out to me by Dr. Strange.  See initial ED Provider note for full details of the presentation. Interval history is pertinent for no reported acute events on the shift prior.    Home meds reviewed and ordered/administered: Yes    Medically stable for inpatient mental health admission: Yes.    Evaluated by mental health: Yes. The recommendation is for inpatient mental health treatment. Bed search in process    Safety concerns: At the time I received sign out, there were no safety concerns.    Hold Status:  Active Orders   Legal    Emergency Hospitalization Hold (72 Hr Hold)     Frequency: Effective Now     Start Date/Time: 08/01/22 2100      Number of Occurrences: Until Specified     Order Comments: Please is psychotic and impaired in her thinking and unable to care for self              Exam:   Patient Vitals for the past 24 hrs:   BP Pulse Resp SpO2   08/2000 120/78 68 16 100 %           ED Course:    Medications   OLANZapine zydis (zyPREXA) ODT tab 10 mg (10 mg Oral Given 8/2/22 2004)   OLANZapine zydis (zyPREXA) ODT tab 10 mg (10 mg Oral Given 8/1/22 1746)   OLANZapine zydis (zyPREXA) ODT tab 10 mg (10 mg Oral Given 8/2/22 0755)   OLANZapine (zyPREXA) injection 10 mg (10 mg Intramuscular Given 8/2/22 1025)   haloperidol lactate (HALDOL) injection 5 mg (5 mg Intramuscular Given 8/2/22 1304)   LORazepam (ATIVAN) injection 2 mg (2 mg Intramuscular Given 8/2/22 1304)            There were no significant events during my shift.    Patient was signed out to the oncoming provider      Impression:    ICD-10-CM    1. Psychosis, unspecified psychosis type (H)  F29    2. Polysubstance abuse (H)  F19.10        Plan:    1. Awaiting inpatient mental health admission/transfer.      RESULTS:   No results found for this visit on 08/01/22 (from the past 24 hour(s)).          MD Crow Mcneil,  MD Aro[place]o  08/03/22 0831

## 2022-08-03 NOTE — H&P
Admitted: 08/01/2022    CHIEF COMPLAINT:  Evaluation for suicidal ideation, evonne.    IDENTIFYING INFORMATION:  Chino Ken is a 21-year-old -American female brought in by father for concerns of suicidal ideation and evonne.    HISTORY OF PRESENT ILLNESS:  Chino Ken is a 21-year-old -American female brought in by father for concerns for suicidal ideation and evonne.  The patient was presenting as very disorganized.  She was hospitalized about a year ago in [facility] in [place] for manic behavior and chemical dependency.   At that time, she was diagnosed with bipolar disorder with evonne.  The patient told her father that she relapsed on drugs 2 weeks ago.  Patient told provider that she only uses marijuana.  The patient is denying any opiate use.  The patient reports she no longer is on Suboxone.  The patient on admission to the emergency room was experiencing auditory and visual hallucinations and having suicidal thinking.  The patient also was not sleeping for approximately 4 days.  The patient was very disorganized and labile.    The patient presents on unit 4A as calm.  She is able to track conversation.  She describes her mood as happy.  She does not seem to be overstimulated.  The patient is able to answer questions appropriately.  Her speech is not pressured.  She is maintaining adequate eye contact.  The patient is interested in taking medications.  Goal for this hospitalization is stabilization with medications and finding resources for patient.    PSYCHIATRIC REVIEW OF SYSTEMS:  The patient denies any depression.  She describes her mood as happy.  The patient denies suicidal ideation at this time.  She denies homicidal ideation.  She does not endorse any symptoms of evonne.  Her speech is measured.  She maintained adequate eye contact.  She is able to follow conversation.  She is not endorsing psychosis.  Does not appear to be preoccupied by internal stimuli and denies auditory or visual  "hallucinations.  Denies any feelings of paranoia.  The patient denies any anxiety.  The patient denies PTSD, eating disorder and OCD symptoms.    PSYCHIATRIC HISTORY:  The patient was hospitalized in 08/2021 in [place] for manic behavior including grandiosity, insomnia, erratic behavior, agitation.  The patient shaved off her full head of hair.  The patient was treated with risperidone 1 mg b.i.d.  The patient reports she stopped taking risperidone.  The patient states, \"I didn't think I needed it.\"  The patient has a history of being on Suboxone.  The patient reports she no longer takes it.    PAST MEDICAL HISTORY:  Obesity.  Admission labs are pending.  HCG negative.  COVID screen negative.    ALLERGIES:  NO KNOWN ALLERGIES.    SUBSTANCE ABUSE HISTORY:  The patient is positive for cannabinoids.  She has a history of abusing opiates and was on Suboxone at one time.  The patient reports she no longer takes that.  The patient reports she continues to use cigarettes and wants nicotine gum.    FAMILY HISTORY:  The patient reports parents are .  Mother endorses depression.  She has 1 brother, 2 sisters.    SOCIAL HISTORY:  The patient lives with family.  Parents are .  She has 2 sisters, 1 brother.  She graduated from high school, attended [employer], but failed all her classes due to substance abuse.  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    MEDICAL REVIEW OF SYSTEMS:  Reviewed documentation for a 10-point systems review completed by Tr Cyr MD, dated 08/01/2022.  The patient is calm.  She is able to concentrate.  She is denying any auditory or visual hallucinations.  Denies suicidal thinking.    PHYSICAL EXAMINATION:    VITAL SIGNS:  Blood pressure 125/88, pulse 66, temperature 97.3 Fahrenheit, respirations 16, SpO2 at 100%.  Reviewed documentation for physical examination completed by Tr Cyr MD, dated 08/01/2022. The patient is attentive and able to follow " "conversations.  She denies suicidal thinking.    MENTAL STATUS EXAMINATION:  The patient appears her stated age.  He is dressed in scrubs.  She has adequate hygiene.  The patient was cooperative in meeting with provider in the interview room.  Eye contact:  Adequate.  She did not display psychomotor abnormalities.  Speech was spontaneous.  She used conversational rate, rhythm, and tone.  She was somewhat guarded with her answers especially around chemical dependency.  Mood is described as \"happy.\"  Affect full range.  Thought process:  Linear and logical.  Associations intact.  Thought content did not display evidence of psychosis.  She denies passive suicidal thoughts.  Denies active intent.  Denies homicidal thinking.  Insight and judgment appear to be fair.  Cognition appears intact to interviewing including orientation to person, place, time, and situation, use language and fund of knowledge.  Recent and remote memory are grossly intact.  Muscle strength, tone, and gait appeared within normal upon observation.    ASSESSMENT:     1.  Bipolar 1 disorder, most recent episode manic.  2.  Cannabis use disorder, severe.  3.  History of opiate use disorder with suboxone maintenance.    PLAN:     1.  The patient has been admitted to Behavioral Unit 4A on a 72-hour hold.  We will continue hold to allow for a period of observation and willingness to cooperate with the treatment plan.  2.  Discussed medications with the patient.  The patient will continue on Zyprexa 10 mg b.i.d.  The patient was willing to start lithium 450 mg b.i.d. to address her bipolar disorder.  We discussed risks, benefits, and side effects of medication with the patient.  3.  Psychosocial treatments to be addressed with [facility].  4.  Estimated length of stay is 3 to 5 days.      Debra A. Naegele, APRN, CNS        D: 2022   T: 2022   MT: BREANNE    Name:     REBECCA MAZA  MRN:      [id number]        Account:     [id number]   :      " 2000           Admitted:    08/01/2022       Document: [id number]

## 2022-08-03 NOTE — ED NOTES
Civil Commitment Status    Current commitment status is: under 72 Hour Hold expiring 8.4.22 at 2100 and petition for civil commitment has been filed on pre-petition and exhibit A faxed to [facility] on 8.3.2022 at 12:45pm. Examiner statement faxed at 2:12pm.    Writer spoke with [facility] civil committment screener Marcio. He reports screen with be assigned to an  [facility] involved: n/a

## 2022-08-03 NOTE — CONSULTS
"      Initial Psychiatric Consult   Consult date: August 3, 2022         Reason for Consult, requesting source:    Patient's 72 hour hold is expiring   Complete DEC assessment completed 8/1    Requesting source: Aroldo Maria    Labs and imaging reviewed. Patient seen and evaluated by DEIDRE Hansen CNP          HPI:   Per ED provider note 8/1: Chino Ken is a 21 year old female who is here brought in by father with concerns that she has not been sleeping for 4 days and now appears manic and disorganized. She has history of polysubstance abuse and previously was hospitalized at [facility] a year ago where she was stabilized and sent to MI/CD treatment. She was diagnosed with bipolar disorder. She did well and returned home, was working and functioning appropriately.    Per DEC assessment 8/1: Upon meeting with the patient, she was observed to be euphoric, hyperverbal, labile, responding to internal stimuli and engaging in confabulation. Patient stated she was suicidal with a plan to \"stab myself in the heart\" and reported she is hearing voices telling her to \"have sex\", \"watch freaky stuff\" and \"murder everyone\". She also reported she is using \"everything\" in regards to drugs and alcohol \"all the time\" \"every day\".     Patient is currently on Zyprexa ODT 10mg TID.  In the ED, patient has received Haldol 5mg and Ativan 2mg together 8/2 at 1304, Zyprexa 10mg IM 8/2 at 1025. Patient has received a total of 50mg of Zyprexa since 8/1 at 1746. Prior to admission, it appeared patient was only on Atarax as needed for anxiety/itchiing/withdrawal.     Upon assessment, patient was sleeping in her room. States that she is now very tired and needs to catch up on sleep but wants to do it from home. She is organized throughout interview and states she was on Risperdal after psych IP stay last year but stopped taking it a day after she got home because she thought it wasn't the right med for her. Patient states that she thinks " "she has bipolar and reactive attachment disorder rather than borderline. Reports she wants a care plan in place and to work on things to do to prevent this from happening again. Agreeable to inpatient psychiatry. Patient denies SI/HI/AVH.         Past Psychiatric History:     Self Injurious Behavior: cutting. Pt has not engaged in SIB since 2020    History of commitment: Records indicate there was an attempt to petition for commitment by [facility] however when looking more into this there is no record of civil commitment or Olson order.     Hospitalizations: [facility] ED on 8/21 and discharged to [place] psychiatry -- discharged on Remeron 15, Melatonin 3mg, Risperdal 1mg BID    Psychotropic Trials: Celexa, Wellbutrin, straterra (adhd), suboxone (MAT for OUD), gabapentin, lamotrigine.       No current outpatient psych care team        Substance Use and History:   patient reported she has been using \"everything\" however she did report specifically fentanyl which she has had an addiction to before and THC cartridges    Yes positive for THC and fentanyl will not show on this screenas it is a synthetic opiate.        Past Medical History:   PAST MEDICAL HISTORY: No past medical history on file.    PAST SURGICAL HISTORY: No past surgical history on file.          Family History:   FAMILY HISTORY: No family history on file.    Family Psychiatric History: no record of family mental health issues or substance abuse        Social History:   SOCIAL HISTORY:   Social History     Tobacco Use     Smoking status: Never Smoker     Smokeless tobacco: Never Used   Substance Use Topics     Alcohol use: Never     Father reported patient actually resides with her 1 younger sister and her mother and he lives separately. Patient graduated high school and attended college at [employer] however failed all of her classes due to substance abuse/mental health issues and has never returned and did not complete with a degree. "          Physical ROS:   The 10 point Review of Systems is negative other than noted in the HPI or here.           Medications:       OLANZapine zydis  10 mg Oral TID              Allergies:   No Known Allergies       Labs:     Recent Results (from the past 48 hour(s))   HCG qualitative urine    Collection Time: 08/01/22  3:12 PM   Result Value Ref Range    hCG Urine Qualitative Negative Negative   Drug abuse screen 1 urine (ED)    Collection Time: 08/01/22  3:12 PM   Result Value Ref Range    Amphetamines Urine Screen Negative Screen Negative    Barbiturates Urine Screen Negative Screen Negative    Benzodiazepines Urine Screen Negative Screen Negative    Cannabinoids Urine Screen Positive (A) Screen Negative    Cocaine Urine Screen Negative Screen Negative    Opiates Urine Screen Negative Screen Negative   Asymptomatic COVID-19 Virus (Coronavirus) by PCR Nasopharyngeal    Collection Time: 08/01/22  5:46 PM    Specimen: Nasopharyngeal; Swab   Result Value Ref Range    SARS CoV2 PCR Negative Negative          Physical and Psychiatric Examination:     /78   Pulse 71   Temp 98  F (36.7  C)   Resp 16   SpO2 98%   Weight is 0 lbs 0 oz  There is no height or weight on file to calculate BMI.    Physical Exam:  I have reviewed the physical exam as documented by by the medical team and agree with findings and assessment and have no additional findings to add at this time.    Mental Status Exam:    Appearance: awake, alert, adequately groomed and dressed in hospital scrubs  Attitude:  cooperative  Eye Contact:  fair  Mood:  better  Affect:  appropriate and in normal range and mood congruent  Speech:  clear, coherent  Language: Fluent in english   Psychomotor Behavior:  no evidence of tardive dyskinesia, dystonia, or tics  Thought Process:  logical, linear and goal oriented  Associations:  no loose associations  Thought Content:  no evidence of suicidal ideation or homicidal ideation and no evidence of psychotic  thought  Insight:  fair  Judgement:  limited  Oriented to:  time, person, and place  Attention Span and Concentration:  fair  Recent and Remote Memory:  intact  Fund of Knowledge: Appropriate   Gait and Station: normal, intact               DSM-5 Diagnosis:   Bipolar I Disorder Current or Most Recent Episode Manic, with psycholtic features  Marijuana use disorder   History of opioid use disorder, on Suboxone therapy, in sustained remission.            Assessment:   Chino is a 21 year old female with a history of bipolar disorder with psychotic features who presents with symptoms consistent of a manic episode with psychotic features. At time of interview, patient appears more organized as she has received a total of Zyprexa 50mg since being here 8/1 as well as Haldol 5 and Ativan. However with recent suicidal ideation, homicidal ideation, and psychosis with poor follow-up and medication adherence patient is at risk of imminent harm to her and others. Patient was in seclusion and restraints less than 24 hours ago.           Summary of Recommendations:     Recommend inpatient psychiatry. Filling out Examiner's statement and Olson for Risperdal, Zyprexa, Seroquel, and Abilify in [facility].     Reduced Zyprexa from 10mg TID to 10mg BID.         Janette Terrazas, [id number]  Consult/Liaison Psychiatry   [facility]

## 2022-08-03 NOTE — PLAN OF CARE
08/03/22 1255   Patient Belongings   Patient Belongings locker   Patient Belongings Remaining with Patient clothing;shoes;glasses   Belongings Search Yes   Clothing Search Yes   Second Staff Monique BANKS   Goal Outcome Evaluation:    Brown hijab  Green dress  Yellow dress  Black slip skirt  Crocs flip-flops  Glasses      ..A               Admission:  I am responsible for any personal items that are not sent to the safe or pharmacy.  [facility] is not responsible for loss, theft or damage of any property in my possession.    Signature:  _________________________________ Date: _______  Time: _____                                              Staff Signature:  ____________________________ Date: ________  Time: _____      2nd Staff person, if patient is unable/unwilling to sign:    Signature: ________________________________ Date: ________  Time: _____     Discharge:  [facility] has returned all of my personal belongings:    Signature: _________________________________ Date: ________  Time: _____                                          Staff Signature:  ____________________________ Date: ________  Time: _____

## 2022-08-03 NOTE — PLAN OF CARE
"Goal Outcome Evaluation:        ADMIT: this pt arrived on 4a from ED at 1335. She was calm. She lives with parents. She was admitted for evonne along with depression.   B: Pt reports command AH telling her to harm herself and other.  VH.  SI w/ plan to stab herself.  HI - \"kill everyone\".  Utox pos for marijuana.  Pt reports using all substances.  Pt reports decreased sleep.   reports pt is crying and then laughing.  Hx of bipolar.  COVID collected.      A: 72 hour hold     R: COVID (-)     Pt has settled well on the unit at this time. She has contracted for safety.              "

## 2022-08-03 NOTE — TELEPHONE ENCOUNTER
R:  Patient cleared and ready for behavioral bed placement: Yes     Naegele paged @ 10:49am to present to 4A/Naegele D. Naegele called back @ 10:54am and accepted pt for 4A  Pt placed in unit queue and charge called with disposition @ 10:55am  [place] ED Updated with placement @ 11am

## 2022-08-04 PROCEDURE — 99232 SBSQ HOSP IP/OBS MODERATE 35: CPT | Performed by: CLINICAL NURSE SPECIALIST

## 2022-08-04 PROCEDURE — 124N000002 HC R&B MH UMMC

## 2022-08-04 PROCEDURE — 250N000013 HC RX MED GY IP 250 OP 250 PS 637: Performed by: CLINICAL NURSE SPECIALIST

## 2022-08-04 PROCEDURE — 250N000013 HC RX MED GY IP 250 OP 250 PS 637

## 2022-08-04 PROCEDURE — H2032 ACTIVITY THERAPY, PER 15 MIN: HCPCS

## 2022-08-04 RX ORDER — OLANZAPINE 20 MG/1
20 TABLET ORAL AT BEDTIME
Status: DISCONTINUED | OUTPATIENT
Start: 2022-08-05 | End: 2022-08-05 | Stop reason: HOSPADM

## 2022-08-04 RX ADMIN — LITHIUM CARBONATE 450 MG: 450 TABLET, EXTENDED RELEASE ORAL at 20:06

## 2022-08-04 RX ADMIN — LITHIUM CARBONATE 450 MG: 450 TABLET, EXTENDED RELEASE ORAL at 07:34

## 2022-08-04 RX ADMIN — TRAZODONE HYDROCHLORIDE 50 MG: 50 TABLET ORAL at 20:57

## 2022-08-04 RX ADMIN — OLANZAPINE 10 MG: 10 TABLET, ORALLY DISINTEGRATING ORAL at 20:06

## 2022-08-04 RX ADMIN — CETIRIZINE HYDROCHLORIDE 10 MG: 10 TABLET, FILM COATED ORAL at 07:34

## 2022-08-04 RX ADMIN — OLANZAPINE 10 MG: 10 TABLET, ORALLY DISINTEGRATING ORAL at 07:34

## 2022-08-04 RX ADMIN — SENNOSIDES AND DOCUSATE SODIUM 2 TABLET: 50; 8.6 TABLET ORAL at 01:22

## 2022-08-04 ASSESSMENT — ACTIVITIES OF DAILY LIVING (ADL)
ADLS_ACUITY_SCORE: 18

## 2022-08-04 NOTE — PROGRESS NOTES
"SPIRITUAL HEALTH SERVICES  SPIRITUAL ASSESSMENT Progress Note  [facility] ([facility] [place]) 4A YOUNG ADULT MH       REFERRAL SOURCE: Self initiated  visit, Protestant specific.     DATA: Pt Chino Ken identifies as Protestant and is of [place] descent.     I introduced myself to Chino as the Lead Protestant  and oriented her to [facility].     Chino shared with me that she is currently attending [employer] and aspires to, \"I wanna help people\". 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     Chino has received an Malay/English copy of the Holy Quran. I also provided Chino with elements of reflective conversation and empathic listing throughout our encounter.      PLAN: I will follow up with Chino for the duration of her stay. [facility] is available to Chino per request.     Celio Guadarrama  Lead Protestant   Pager [phone]    [facility] remains available 24/7 for emergent requests/referrals, either by having the switchboard page the on-call  or by entering an ASAP/STAT consult in Epic (this will also page the on-call ).    "

## 2022-08-04 NOTE — PROGRESS NOTES
Patient had a tough time falling and staying asleep last night. Patient was in and out of room a couple to times. No safety or behavioral concerns. Patient slept for 2.5 hrs.

## 2022-08-04 NOTE — PROGRESS NOTES
Pt participated in dance/movement therapy (DMT) using a structured body-based nonverbal directive, allowing for creative expression and social engagement.  Pt was initially making self-contradictory statements in a polite manner, then became more clear and integrated throughout the activities.  She asked a few clarifying questions at times, but then was able to follow the directive with creative self-expression and personal insight.       08/04/22 1015   Group Therapy Session   Group Attendance attended group session   Total Time patient participated (minutes) 50   Group Type expressive therapy;psychotherapeutic   Group Topic Covered emotions/expression;structured socialization   Group Session Detail DMT

## 2022-08-04 NOTE — PROGRESS NOTES
"[facility], [place]   Psychiatric Progress Note        Interim History:   The patient's care was discussed with the treatment team during the daily team meeting and/or staff's chart notes were reviewed.  Staff report patient is visible in the milieu.     Psychiatric symptoms and interventions:     Patient presents in a calm manner. She signed an FUAD for father. Provider felt message for father.     Patient is organized, speech is not pressured, and is cooperative. Patient talked about going to [facility] for one month and felt that he relapse was due to her trip. Patient reports she wanted to return back to [place]. Patient reports mood is \"OK\" and she deneis suicidal thinking. She deneis AH and VH. Affect is not heightened. Patient slept 2.5 our last evneing. Provider expressed concern about poor sleep. She was not disruptive. During the evening.     Provider explained medications. Provider will schedule Zyprexa in the evening. Patient will continue lithium 450 mg BID to address evonne. Provider explained she will need to get a lithium level after discharge.     Tentative discharge for Friday.          Medications:       cetirizine  10 mg Oral Daily     lithium ER  450 mg Oral Q12H [facility] (08/20)     [START ON 8/5/2022] OLANZapine  20 mg Oral At Bedtime     OLANZapine zydis  10 mg Oral BID          Allergies:   No Known Allergies       Labs:   No results found for this or any previous visit (from the past 24 hour(s)).       Psychiatric Examination:     /86   Pulse 100   Temp (!) 96.7  F (35.9  C) (Temporal)   Resp 16   Ht 1.626 m (5' 4\")   Wt 96.8 kg (213 lb 8 oz)   SpO2 99%   BMI 36.65 kg/m    Weight is 213 lbs 8 oz  Body mass index is 36.65 kg/m .  Orthostatic Vitals  Report    None            Appearance: awake, alert and adequately groomed  Attitude:  cooperative  Eye Contact:  good  Mood:  good  Affect:  appropriate and in normal range  Speech:  clear, coherent  Psychomotor " Behavior:  no evidence of tardive dyskinesia, dystonia, or tics  Throught Process:  linear and goal oriented  Associations:  no loose associations  Thought Content:  no evidence of suicidal ideation or homicidal ideation, no auditory hallucinations present and no visual hallucinations present  Insight:  fair  Judgement:  fair  Oriented to:  time, person, and place  Attention Span and Concentration:  intact  Recent and Remote Memory:  intact    Clinical Global Impressions  First:     Most recent:            Precautions:     Behavioral Orders   Procedures     Code 1 - Restrict to Unit     Routine Programming     As clinically indicated     Status 15     Every 15 minutes.     Suicide precautions     Patients on Suicide Precautions should have a Combination Diet ordered that includes a Diet selection(s) AND a Behavioral Tray selection for Safe Tray - with utensils, or Safe Tray - NO utensils            DIagnoses:   1.  Bipolar 1 disorder, most recent episode manic.  2.  Cannabis use disorder, severe.  3.  History of opiate use disorder with suboxone maintenance         Plan:     Legal status: Patient on court hold. Petition for MI commitment was placed in the ED.      Medication management:   Lithium 450 mg BID   Zyprexa 20  Mg at HS changed from 10 mg BID     Medical: Obesity.  Admission labs are pending.  HCG negative.  COVID screen negative.     Behavioral/psychology/social:   [facility]   Provider educated patient regrding the detrimental effects of cannabis on her mood. Recommendation was to abstain from all illicit substances.   Tentative discharge to home (Friday) with CD assessment and psychiatric appointment.

## 2022-08-04 NOTE — PLAN OF CARE
Initial Psychosocial Assessment    I have reviewed the chart, met with the patient, and developed Care Plan.      Patient Legal (Hospital) Status: 72HH expires 8/4 @ 2100.     Presenting Problem:  Per ED: Chino Ken is a 21 year old female who is here brought in by father with concerns that she has not been sleeping for 4 days and now appears manic and disorganized. She has history of polysubstance abuse and previously was hospitalized at [facility] a year ago where she was stabilized and sent to MI/CD treatment. She was diagnosed with bipolar disorder. She did well and returned home, was working and functioning appropriately.     Patient told her father that she had relapsed on drugs 2 weeks ago and now presents again with evonne and psychosis. She admits to having visual and auditory hallucinations and thoughts of harm to others and self. Her affect is incongruent with her disclosed thoughts of harm however as she appears elevated in mood. She tracks poorly.     Mental health and Chemical use history:   Patient has historical diagnoses of borderline personality disorder, ADHD, Suboxone treatment for opioid use disorder (Percocet, snorting fentanyl), marijuana use and Bipolar Disorder with psychotic features. Patient has history of depressive symptoms, manic episodes and psychosis. Patient also has a history of SIB via cutting however has not engaged in this behavior since 2020. Patient was seeing a medication management provider at [facility] Anisa Shaffer in August of 2021 and prior to that worked with primary care providers. Patient denied any current providers. Records indicate there was an attempt to petition for commitment by [facility] however when looking more into this there is no record of civil commitment or Olson order. Patient was hospitalized at [facility] in 8/2021 then transferred to [facility].     PT reported she last used drugs on 7/19/22. Pt has a hx of drug abuse  "and withdrawal symptoms. Pt reports she has been using \"everything\" however she did report specifically fentanyl which she has had an addiction to before and THC cartridges. Patient attended [facility] for suboxone and treatment but did not complete the program. Per ED assessment: positive for THC and fentanyl will not show on this screenas it is a synthetic opiate.      Family Description (Constellation, Family Psychiatric History):  Pt's mother and father are not together. Pt lives with her mom in [place] currently. Pt has 1 sister. PT reports her mother endorses depression and suicidal thinking.     Significant Life Events (Illness, Abuse, Trauma, Death):  Patient denied any historical trauma.    Living Situation:  Patient resides with her mom and younger sibling in [place]    Educational Background:  Patient graduated high school and attended college at [employer] however failed all of her classes due to substance abuse/mental health issues and has never returned and did not complete with a degree.     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    Financial Status:  Wages    Legal Issues:  Patient denies     Ethnic/Cultural Issues:  Patient identifies as Slovenian.    Spiritual Orientation:  Yarsanism     Service History:  Denies    Current Treatment Providers are:  No current treatment providers    Social Service Assessment/Social Functioning/Plan:  Patient has been admitted for evonne and disorganization. Patient will have psychiatric assessment and medication management by the psychiatrist. Medications will be reviewed and adjusted per MD as indicated. The treatment team will continue to assess and stabilize the patient's mental health symptoms with the use of medications and therapeutic programming. Hospital staff will provide a safe environment and a therapeutic milieu. Staff will continue to assess patient as needed. Patient will " participate in unit groups and activities. Patient will receive individual and group support on the unit.  CTC will do individual inpatient treatment planning and after care planning. CTC will discuss options for increasing community supports with the patient. CTC will coordinate with outpatient providers and will place referrals to ensure appropriate follow up care is in place.

## 2022-08-04 NOTE — DISCHARGE INSTRUCTIONS
Behavioral Discharge Planning and Instructions    Summary: You were admitted on 8/1/2022  due to Manic Symptomology.  You were treated by Debra Naegele, APRN and discharged on 8/5/2022 from [facility] Home    Main Diagnosis:   1.  Bipolar 1 disorder, most recent episode manic.  2.  Cannabis use disorder, severe.  3.  History of opiate use disorder with suboxone maintenance.    Health Care Follow-up:   Chemical Dependency Assessment:  Appointment Date/Time: Tuesday, August 9th @ 10:30am  (VIDEO APPOINTMENT)       CD : Zully Olmos     Address: [facility] (VIDEO APPOINTMENT)     Phone Number: [phone]    Psychiatry:     Appointment Date/Time: Thursday, September 15th @ 10:30am (IN-OFFICE APPOINTMENT)        Psychiatric Provider: TRACEE Anderson PA-C    Address: [facility]: [address] [place] [phone]  Phone: [phone]    [phone]    Individual Therapy:   A referral to  was made on your behalf for Individual therapy. The are currently reviewing your information. A staff member should reach out to soon to set up an INTAKE appointment. Please follow up with them if you do not hear from them with in the next 5-7 says.  Address: [address] [place] [address]  Phone: [phone]  E-mail: [email or web address]    [facility]:   Offers; medical, dental, behavioral health, support services, also offer a Wellness Center  Address: [address] [place] [address]  Phone: [phone]  E-mail: [email or web address]    Chemical Dependency Referral:   [facility]:   Address : [address] [facility], [address], [place] [address]  Tel: [phone], [phone]    [place] [facility]  [facility] connects people seeking recovery to resources that help foster and sustain long-term recovery.  Whether you are seeking resources for treatment, transportation,  "housing, job training, education, health care or other pathways to recovery, [facility] is a great place to start.    Phone: 595.191.1259.  www.[place].[employer] (Great listing of all types of recovery and non-recovery related resources)    Attend all scheduled appointments with your outpatient providers. Call at least 24 hours in advance if you need to reschedule an appointment to ensure continued access to your outpatient providers.     Major Treatments, Procedures and Findings:  You were provided with: a psychiatric assessment, assessed for medical stability, medication evaluation and/or management, and group therapy    Symptoms to Report: feeling more aggressive, increased confusion, losing more sleep, mood getting worse, or thoughts of suicide    Early warning signs can include: increased depression or anxiety sleep disturbances increased thoughts or behaviors of suicide or self-harm  increased unusual thinking, such as paranoia or hearing voices    Safety and Wellness:  Take all medicines as directed.  Make no changes unless your doctor suggests them.      Follow treatment recommendations.  Refrain from alcohol and non-prescribed drugs.  Ask your support system to help you reduce your access to items that could harm yourself or others. If there is a concern for safety, call 911.    Resources:   Crisis Intervention: 459.826.2118 or 467-310-7045 (TTY: 586.769.1830).  Call anytime for help.  National [place] [employer] Mental Illness (www.[place].nika.org): 367.717.7630 or 673-875-4504.  Alcoholics Anonymous (www.alcoholics-anonymous.org): Check your phone book for your local chapter.  Self- Management and Recovery Training., SMART-- Toll free: 653.225.2682  www.[employer]  [facility] Crisis (COPE) Response - Adult 042 678-5145  Text 4 Life: txt \"LIFE\" to 30654 for immediate support and crisis intervention  Crisis text line: Text \"[place]\" to 413409. Free, confidential, 24/7.    General Medication Instructions:   See " your medication sheet(s) for instructions.   Take all medicines as directed.  Make no changes unless your doctor suggests them.   Go to all your doctor visits.  Be sure to have all your required lab tests. This way, your medicines can be refilled on time.  Do not use any drugs not prescribed by your doctor.  Avoid alcohol.    Advance Directives:   Scanned document on file with [facility]? No scanned doc  Is document scanned? No. Copy Requested.  Honoring Choices Your Rights Handout: Informed and given  Was more information offered? Pt declined    The Treatment team has appreciated the opportunity to work with you. If you have any questions or concerns about your recent admission, you can contact the unit which can receive your call 24 hours a day, 7 days a week. They will be able to get in touch with a Provider if needed. The unit number is [phone] .

## 2022-08-04 NOTE — PLAN OF CARE
Problem: Behavioral Disturbance  Goal: Behavioral Disturbance  Description: Signs and symptoms of listed problems will be absent or manageable by discharge or transition of care.  Outcome: Ongoing, Progressing   Goal Outcome Evaluation:        Pt was fairly quiet and cooperative tonight. She took some naps and we discussed zydis and how it helps. She ate supper and was engaging with other pt's & staff.  She denies SI at this moment & denying AH.  We will continue to assess.

## 2022-08-04 NOTE — PLAN OF CARE
Goal Outcome Evaluation:        Nyla from [facility] court called and pt has been placed on a court hold as of 1609 today.  Mahi (provider) has been informed of this.

## 2022-08-04 NOTE — PROGRESS NOTES
08/04/22 1204   Individualization/Patient Specific Goals   Patient Personal Strengths family/social support;independent living skills;no history of violence;stable living environment   Patient Vulnerabilities substance abuse/addiction;limited support system   Anxieties, Fears or Concerns Denies   Individualized Care Needs Outpatient CD treatment   Patient-Specific Goals (Include Timeframe) Mood stabilization   Interprofessional Rounds   Summary Discussed reason for admission, backgroud hx and current progress on the unit.   Participants advanced practice nurse;CTC;nursing   Team Discussion   Participants Debra Naegele, APRN, CNS; Marlo Greene RN; FRED Mo; Pia Marley OT   Progress Continuing to assess   Anticipated length of stay 1 day   Continued Stay Criteria/Rationale New patient   Medical/Physical None   Plan CTC will meet with pt to complete psychosoical assessment. CTC will coordinate disposition and after care planning.   Rationale for change in precautions or plan No Change   Anticipated Discharge Disposition home with family   PRECAUTIONS AND SAFETY    Behavioral Orders   Procedures    Code 1 - Restrict to Unit    Routine Programming     As clinically indicated    Status 15     Every 15 minutes.    Suicide precautions     Patients on Suicide Precautions should have a Combination Diet ordered that includes a Diet selection(s) AND a Behavioral Tray selection for Safe Tray - with utensils, or Safe Tray - NO utensils

## 2022-08-04 NOTE — PLAN OF CARE
08/04/22 1300   Group Therapy Session   Group Attendance attended group session   Time Session Began 1115   Time Session Ended 1225   Total Time patient participated (minutes) 15  (no charge)   Total # Attendees 5-7   Group Type life skill;psychoeducation   Group Session Detail OT: Goal Setting group to promote self-awareness/insight, recovery planning, communication, healthy coping skills, and opportunity for positive social interactions.   Patient Response/Contribution cooperative with task;discussed personal experience with topic;expressed readiness to alter behaviors   Patient Response Detail Pt joined group for the last 15 minutes. While present in group, she did actively participate and share openly with staff and peers. Pt stated that her goal is to learn how to cope with her mental health. She was able to iddentify that during previous admissions, she did not make an attempt to be involved in her care (wasn't honest with providers, not going to group, etc.). Pt stated that she would like to have an active role in her care this admission. Supportive and encouraging to peers. Appears to have some insight, though somewhat concrete thought process and appeared overly agreeable with peers at times.

## 2022-08-04 NOTE — PLAN OF CARE
Problem: Behavioral Disturbance  Goal: Behavioral Disturbance  Description: Signs and symptoms of listed problems will be absent or manageable by discharge or transition of care.  8/4/2022 0841 by Cm Greene, RN  Outcome: Ongoing, Progressing  8/3/2022 2012 by Cm Greene, RN  Outcome: Ongoing, Progressing   Goal Outcome Evaluation:      Pt was up early for her meds. We discussed her zydis meds. She has been pleasant and cooperative.  She states she has been doing well.  We will continue to assess.

## 2022-08-05 VITALS
HEART RATE: 92 BPM | BODY MASS INDEX: 36.45 KG/M2 | WEIGHT: 213.5 LBS | OXYGEN SATURATION: 96 % | TEMPERATURE: 98.5 F | HEIGHT: 64 IN | RESPIRATION RATE: 16 BRPM | SYSTOLIC BLOOD PRESSURE: 106 MMHG | DIASTOLIC BLOOD PRESSURE: 72 MMHG

## 2022-08-05 PROBLEM — F31.10 BIPOLAR AFFECTIVE DISORDER, CURRENT EPISODE MANIC (H): Status: ACTIVE | Noted: 2022-08-05

## 2022-08-05 PROBLEM — F29 PSYCHOSIS, UNSPECIFIED PSYCHOSIS TYPE (H): Status: RESOLVED | Noted: 2022-08-03 | Resolved: 2022-08-05

## 2022-08-05 PROBLEM — F31.9 BIPOLAR DISORDER (H): Status: ACTIVE | Noted: 2021-08-17

## 2022-08-05 PROBLEM — F31.10 BIPOLAR AFFECTIVE DISORDER, CURRENT EPISODE MANIC (H): Status: RESOLVED | Noted: 2022-08-05 | Resolved: 2022-08-05

## 2022-08-05 LAB
ALBUMIN SERPL-MCNC: 3.6 G/DL (ref 3.4–5)
ALP SERPL-CCNC: 66 U/L (ref 40–150)
ALT SERPL W P-5'-P-CCNC: 20 U/L (ref 0–50)
ANION GAP SERPL CALCULATED.3IONS-SCNC: 4 MMOL/L (ref 3–14)
AST SERPL W P-5'-P-CCNC: 15 U/L (ref 0–45)
BILIRUB SERPL-MCNC: 0.3 MG/DL (ref 0.2–1.3)
BUN SERPL-MCNC: 9 MG/DL (ref 7–30)
CALCIUM SERPL-MCNC: 9.6 MG/DL (ref 8.5–10.1)
CHLORIDE BLD-SCNC: 105 MMOL/L (ref 94–109)
CO2 SERPL-SCNC: 28 MMOL/L (ref 20–32)
CREAT SERPL-MCNC: 0.66 MG/DL (ref 0.52–1.04)
GFR SERPL CREATININE-BSD FRML MDRD: >90 ML/MIN/1.73M2
GLUCOSE BLD-MCNC: 78 MG/DL (ref 70–99)
HOLD SPECIMEN: NORMAL
POTASSIUM BLD-SCNC: 4.3 MMOL/L (ref 3.4–5.3)
PROT SERPL-MCNC: 8 G/DL (ref 6.8–8.8)
SODIUM SERPL-SCNC: 137 MMOL/L (ref 133–144)
TSH SERPL DL<=0.005 MIU/L-ACNC: 0.74 MU/L (ref 0.4–4)

## 2022-08-05 PROCEDURE — 99239 HOSP IP/OBS DSCHRG MGMT >30: CPT | Performed by: CLINICAL NURSE SPECIALIST

## 2022-08-05 PROCEDURE — 250N000013 HC RX MED GY IP 250 OP 250 PS 637: Performed by: CLINICAL NURSE SPECIALIST

## 2022-08-05 PROCEDURE — 84443 ASSAY THYROID STIM HORMONE: CPT | Performed by: CLINICAL NURSE SPECIALIST

## 2022-08-05 PROCEDURE — 36415 COLL VENOUS BLD VENIPUNCTURE: CPT | Performed by: CLINICAL NURSE SPECIALIST

## 2022-08-05 PROCEDURE — 80053 COMPREHEN METABOLIC PANEL: CPT | Performed by: CLINICAL NURSE SPECIALIST

## 2022-08-05 RX ORDER — LITHIUM CARBONATE 450 MG
450 TABLET, EXTENDED RELEASE ORAL EVERY 12 HOURS
Qty: 60 TABLET | Refills: 1 | Status: SHIPPED | OUTPATIENT
Start: 2022-08-05

## 2022-08-05 RX ORDER — OLANZAPINE 20 MG/1
20 TABLET ORAL AT BEDTIME
Qty: 30 TABLET | Refills: 1 | Status: SHIPPED | OUTPATIENT
Start: 2022-08-05

## 2022-08-05 RX ORDER — POTASSIUM CHLORIDE 1500 MG/1
20 TABLET, EXTENDED RELEASE ORAL ONCE
Status: COMPLETED | OUTPATIENT
Start: 2022-08-05 | End: 2022-08-05

## 2022-08-05 RX ADMIN — CETIRIZINE HYDROCHLORIDE 10 MG: 10 TABLET, FILM COATED ORAL at 08:05

## 2022-08-05 RX ADMIN — POTASSIUM CHLORIDE 20 MEQ: 1500 TABLET, EXTENDED RELEASE ORAL at 14:04

## 2022-08-05 RX ADMIN — LITHIUM CARBONATE 450 MG: 450 TABLET, EXTENDED RELEASE ORAL at 08:05

## 2022-08-05 RX ADMIN — SENNOSIDES AND DOCUSATE SODIUM 2 TABLET: 50; 8.6 TABLET ORAL at 10:59

## 2022-08-05 ASSESSMENT — ACTIVITIES OF DAILY LIVING (ADL)
ADLS_ACUITY_SCORE: 18

## 2022-08-05 NOTE — PLAN OF CARE
Problem: Behavioral Disturbance  Goal: Behavioral Disturbance  Description: Signs and symptoms of listed problems will be absent or manageable by discharge or transition of care.  8/4/2022 2007 by Cm Greene RN  Outcome: Ongoing, Progressing  8/4/2022 1629 by Cm Greene RN  Outcome: Ongoing, Progressing  8/4/2022 0841 by Cm Greene, RN  Outcome: Ongoing, Progressing   Goal Outcome Evaluation:      Pt stated she was looking forward to going home on Friday.   Her father was here for visiting tonight and stated to this RN he would like to take her home on Friday around 12 noon.  Pt has been calm and appropriate and polite.  Will continue to assess.

## 2022-08-05 NOTE — PROGRESS NOTES
Patient had a tough time falling and staying asleep last night. Patient was in and out of room a couple to times. No safety or behavioral concerns. Patient slept for 4.75 hrs.

## 2022-08-05 NOTE — DISCHARGE SUMMARY
Psychiatric Discharge Summary    Chino Ken MRN# [id number]   Age: 21 year old YOB: 2000     Date of Admission:  8/1/2022  Date of Discharge:  8/5/2022  Admitting Physician:  Javi Bryan MD  Discharge Physician:  Debra A. Naegele, APRN CNS (Contact: [phone])         Event Leading to Hospitalization:   Chino Ken is a 21-year-old -American female brought in by father for concerns for suicidal ideation and evonne.  The patient was presenting as very disorganized.  She was hospitalized about a year ago in [facility] in [place] for manic behavior and chemical dependency.   At that time, she was diagnosed with bipolar disorder with evonne.  The patient told her father that she relapsed on drugs 2 weeks ago.  Patient told provider that she only uses marijuana.  The patient is denying any opiate use.  The patient reports she no longer is on Suboxone.  The patient on admission to the emergency room was experiencing auditory and visual hallucinations and having suicidal thinking.  The patient also was not sleeping for approximately 4 days.  The patient was very disorganized and labile.     The patient presents on unit 4A as calm.  She is able to track conversation.  She describes her mood as happy.  She does not seem to be overstimulated.  The patient is able to answer questions appropriately.  Her speech is not pressured.  She is maintaining adequate eye contact.  The patient is interested in taking medications.  Goal for this hospitalization is stabilization with medications and finding resources for patient.          See Admission note by Naegele, Debra Ann, APRN CNS on 8/3/2022 for additional details.          DIagnoses:   1.  Bipolar 1 disorder, most recent episode manic.  2.  Cannabis use disorder, severe.  3.  History of opiate use disorder with suboxone maintenance.           Labs:     Results for orders placed or performed during the hospital encounter of 08/01/22   HCG  qualitative urine     Status: Normal   Result Value Ref Range    hCG Urine Qualitative Negative Negative   Drug abuse screen 1 urine (ED)     Status: Abnormal   Result Value Ref Range    Amphetamines Urine Screen Negative Screen Negative    Barbiturates Urine Screen Negative Screen Negative    Benzodiazepines Urine Screen Negative Screen Negative    Cannabinoids Urine Screen Positive (A) Screen Negative    Cocaine Urine Screen Negative Screen Negative    Opiates Urine Screen Negative Screen Negative   Asymptomatic COVID-19 Virus (Coronavirus) by PCR Nasopharyngeal     Status: Normal    Specimen: Nasopharyngeal; Swab   Result Value Ref Range    SARS CoV2 PCR Negative Negative    Narrative    Testing was performed using the ngozi  SARS-CoV-2 & Influenza A/B Assay on the ngozi  Maya  System.  This test should be ordered for the detection of SARS-COV-2 in individuals who meet SARS-CoV-2 clinical and/or epidemiological criteria. Test performance is unknown in asymptomatic patients.  This test is for in vitro diagnostic use under the FDA EUA for laboratories certified under CLIA to perform moderate and/or high complexity testing. This test has not been FDA cleared or approved.  A negative test does not rule out the presence of PCR inhibitors in the specimen or target RNA in concentration below the limit of detection for the assay. The possibility of a false negative should be considered if the patient's recent exposure or clinical presentation suggests COVID-19.  [facility] are certified under the Clinical Laboratory Improvement Amendments of 1988 (CLIA-88) as qualified to perform moderate and/or high complexity laboratory testing.   Comprehensive metabolic panel     Status: Normal   Result Value Ref Range    Sodium 137 133 - 144 mmol/L    Potassium 4.3 3.4 - 5.3 mmol/L    Chloride 105 94 - 109 mmol/L    Carbon Dioxide (CO2) 28 20 - 32 mmol/L    Anion Gap 4 3 - 14 mmol/L    Urea Nitrogen 9 7 - 30 mg/dL     Creatinine 0.66 0.52 - 1.04 mg/dL    Calcium 9.6 8.5 - 10.1 mg/dL    Glucose 78 70 - 99 mg/dL    Alkaline Phosphatase 66 40 - 150 U/L    AST 15 0 - 45 U/L    ALT 20 0 - 50 U/L    Protein Total 8.0 6.8 - 8.8 g/dL    Albumin 3.6 3.4 - 5.0 g/dL    Bilirubin Total 0.3 0.2 - 1.3 mg/dL    GFR Estimate >90 >60 mL/min/1.73m2   Extra Tube     Status: None    Narrative    The following orders were created for panel order Extra Tube.  Procedure                               Abnormality         Status                     ---------                               -----------         ------                     Extra Purple Top Tube[[id number]]                            Final result                 Please view results for these tests on the individual orders.   Extra Purple Top Tube     Status: None   Result Value Ref Range    Hold Specimen [facility] access hospital    Urine Drugs of Abuse Screen     Status: Abnormal    Narrative    The following orders were created for panel order Urine Drugs of Abuse Screen.  Procedure                               Abnormality         Status                     ---------                               -----------         ------                     Drug abuse screen 1 urin...[[id number]]  Abnormal            Final result                 Please view results for these tests on the individual orders.            Consults:   Consultation during this admission received from psychiatry    Janette Terrazas APRN CNP   Nurse Practitioner   Psychiatry   Consults      Signed   Date of Service:  8/3/2022 10:56 AM   Creation Time:  8/3/2022 10:56 AM           Consult Orders   Psychiatry IP Consult: Patient is on 72-hour hold which is expiring, plans to position for commitment; Consultant may enter orders: Yes; Patient to be seen: ASAP; [employer] Call back #: [phone]; Requesting provider? Attending physician; Name:... [[id number]] ordered by Aroldo Maria MD at 08/03/22 0858          Expand All Collapse All          []Hide  "copied text    []Hover for details            Initial Psychiatric Consult   Consult date: August 3, 2022          Reason for Consult, requesting source:    Patient's 72 hour hold is expiring   Complete DEC assessment completed 8/1     Requesting source: Aroldo Maria     Labs and imaging reviewed. Patient seen and evaluated by DEIDRE Hansen CNP            HPI:   Per ED provider note 8/1: Chino Ken is a 21 year old female who is here brought in by father with concerns that she has not been sleeping for 4 days and now appears manic and disorganized. She has history of polysubstance abuse and previously was hospitalized at [facility] a year ago where she was stabilized and sent to MI/CD treatment. She was diagnosed with bipolar disorder. She did well and returned home, was working and functioning appropriately.     Per DEC assessment 8/1: Upon meeting with the patient, she was observed to be euphoric, hyperverbal, labile, responding to internal stimuli and engaging in confabulation. Patient stated she was suicidal with a plan to \"stab myself in the heart\" and reported she is hearing voices telling her to \"have sex\", \"watch freaky stuff\" and \"murder everyone\". She also reported she is using \"everything\" in regards to drugs and alcohol \"all the time\" \"every day\".      Patient is currently on Zyprexa ODT 10mg TID.  In the ED, patient has received Haldol 5mg and Ativan 2mg together 8/2 at 1304, Zyprexa 10mg IM 8/2 at 1025. Patient has received a total of 50mg of Zyprexa since 8/1 at 1746. Prior to admission, it appeared patient was only on Atarax as needed for anxiety/itchiing/withdrawal.      Upon assessment, patient was sleeping in her room. States that she is now very tired and needs to catch up on sleep but wants to do it from home. She is organized throughout interview and states she was on Risperdal after psych IP stay last year but stopped taking it a day after she got home because she thought it wasn't the " "right med for her. Patient states that she thinks she has bipolar and reactive attachment disorder rather than borderline. Reports she wants a care plan in place and to work on things to do to prevent this from happening again. Agreeable to inpatient psychiatry. Patient denies SI/HI/AVH.          Past Psychiatric History:      Self Injurious Behavior: cutting. Pt has not engaged in SIB since 2020     History of commitment: Records indicate there was an attempt to petition for commitment by [facility] however when looking more into this there is no record of civil commitment or Olson order.      Hospitalizations: [facility] ED on 8/21 and discharged to [facility] -- discharged on Remeron 15, Melatonin 3mg, Risperdal 1mg BID     Psychotropic Trials: Celexa, Wellbutrin, straterra (adhd), suboxone (MAT for OUD), gabapentin, lamotrigine.         No current outpatient psych care team         Substance Use and History:   patient reported she has been using \"everything\" however she did report specifically fentanyl which she has had an addiction to before and THC cartridges     Yes positive for THC and fentanyl will not show on this screenas it is a synthetic opiate.         Past Medical History:   PAST MEDICAL HISTORY:   Past Medical History   No past medical history on file.        PAST SURGICAL HISTORY:   Past Surgical History   No past surgical history on file.               Family History:   FAMILY HISTORY:   Family History   No family history on file.        Family Psychiatric History: no record of family mental health issues or substance abuse         Social History:   SOCIAL HISTORY:   Social History           Tobacco Use     Smoking status: Never Smoker     Smokeless tobacco: Never Used   Substance Use Topics     Alcohol use: Never      Father reported patient actually resides with her 1 younger sister and her mother and he lives separately. Patient graduated high school and attended college " at [employer] however failed all of her classes due to substance abuse/mental health issues and has never returned and did not complete with a degree.           Physical ROS:   The 10 point Review of Systems is negative other than noted in the HPI or here.             Medications:        OLANZapine zydis  10 mg Oral TID                 Allergies:   No Known Allergies       Labs:      Recent Results         Recent Results (from the past 48 hour(s))   HCG qualitative urine     Collection Time: 08/01/22  3:12 PM   Result Value Ref Range     hCG Urine Qualitative Negative Negative   Drug abuse screen 1 urine (ED)     Collection Time: 08/01/22  3:12 PM   Result Value Ref Range     Amphetamines Urine Screen Negative Screen Negative     Barbiturates Urine Screen Negative Screen Negative     Benzodiazepines Urine Screen Negative Screen Negative     Cannabinoids Urine Screen Positive (A) Screen Negative     Cocaine Urine Screen Negative Screen Negative     Opiates Urine Screen Negative Screen Negative   Asymptomatic COVID-19 Virus (Coronavirus) by PCR Nasopharyngeal     Collection Time: 08/01/22  5:46 PM     Specimen: Nasopharyngeal; Swab   Result Value Ref Range     SARS CoV2 PCR Negative Negative              Physical and Psychiatric Examination:      /78   Pulse 71   Temp 98  F (36.7  C)   Resp 16   SpO2 98%   Weight is 0 lbs 0 oz  There is no height or weight on file to calculate BMI.     Physical Exam:  I have reviewed the physical exam as documented by by the medical team and agree with findings and assessment and have no additional findings to add at this time.     Mental Status Exam:     Appearance: awake, alert, adequately groomed and dressed in hospital scrubs  Attitude:  cooperative  Eye Contact:  fair  Mood:  better  Affect:  appropriate and in normal range and mood congruent  Speech:  clear, coherent  Language: Fluent in english   Psychomotor Behavior:  no evidence of tardive dyskinesia, dystonia, or  tics  Thought Process:  logical, linear and goal oriented  Associations:  no loose associations  Thought Content:  no evidence of suicidal ideation or homicidal ideation and no evidence of psychotic thought  Insight:  fair  Judgement:  limited  Oriented to:  time, person, and place  Attention Span and Concentration:  fair  Recent and Remote Memory:  intact  Fund of Knowledge: Appropriate   Gait and Station: normal, intact                 DSM-5 Diagnosis:   Bipolar I Disorder Current or Most Recent Episode Manic, with psycholtic features  Marijuana use disorder   History of opioid use disorder, on Suboxone therapy, in sustained remission.              Assessment:   Chino is a 21 year old female with a history of bipolar disorder with psychotic features who presents with symptoms consistent of a manic episode with psychotic features. At time of interview, patient appears more organized as she has received a total of Zyprexa 50mg since being here 8/1 as well as Haldol 5 and Ativan. However with recent suicidal ideation, homicidal ideation, and psychosis with poor follow-up and medication adherence patient is at risk of imminent harm to her and others. Patient was in seclusion and restraints less than 24 hours ago.            Summary of Recommendations:      Recommend inpatient psychiatry. Filling out Examiner's statement and Loson for Risperdal, Zyprexa, Seroquel, and Abilify in [facility].      Reduced Zyprexa from 10mg TID to 10mg BID.            Janette Terrazas, [facility] Hospital[facility]-BC  Consult/Liaison Psychiatry   [facility] Course:   Chino Ken was admitted to Station 4A with attending Javi Bryan MD on a 72 hour mental health hold, but patient subsequently signed in voluntarily. The patient was placed under status 15 (15 minute checks) to ensure patient safety.     Patient was continued Zyprexa 20 mg to address psychosis. Lithium 450 mg BID was started to  address mood instability. The ED petitioned for commitment. When patient arrived on unit 4A she was compliant with treatment. She is calm. She is cooperative with her medications. She did not display any aggressive behavior. Provider discussed risks, benefits and side effects of medications.     Provider educated patient on the detrimental effects of cannabis on her mood. Recommendation is to abstain form cannabis use.     Reviewed admission labs: CMP WNL, , bilirubin 0.3, TSH 0.74, Hcg negative, COVID screen negative, UTOX positive for cannibinoids    Chino Ken did participate in groups and was visible in the milieu.     The patient's symptoms of evonne improved. Patient reports improved mood and denies suicidal ideation. Protective factors includes seeking out treatment and supportive parents.     Chino Ken was released to home. At the time of discharge Chino Ken was determined to not be a danger to herself or others.          Discharge Medications:     Current Discharge Medication List      START taking these medications    Details   lithium (ESKALITH CR/LITHOBID) 450 MG CR tablet Take 1 tablet (450 mg) by mouth every 12 hours  Qty: 60 tablet, Refills: 1    Associated Diagnoses: Bipolar affective disorder, currently manic, severe, with psychotic features (H)      OLANZapine (ZYPREXA) 20 MG tablet Take 1 tablet (20 mg) by mouth At Bedtime  Qty: 30 tablet, Refills: 1    Associated Diagnoses: Bipolar affective disorder, currently manic, severe, with psychotic features (H)         CONTINUE these medications which have NOT CHANGED    Details   cetirizine (ZYRTEC ALLERGY) 10 MG tablet Take 10 mg by mouth daily      hydrOXYzine (ATARAX) 50 MG tablet Take 50 mg by mouth every 8 hours as needed for itching         STOP taking these medications       cloNIDine (CATAPRES) 0.1 MG tablet Comments:   Reason for Stopping:                    Psychiatric Examination:   Appearance:  awake, alert and adequately  groomed  Attitude:  cooperative  Eye Contact:  good  Mood:  good  Affect:  appropriate and in normal range  Speech:  clear, coherent  Psychomotor Behavior:  no evidence of tardive dyskinesia, dystonia, or tics  Thought Process:  linear and goal oriented  Associations:  no loose associations  Thought Content:  no evidence of suicidal ideation or homicidal ideation, no auditory hallucinations present and no visual hallucinations present  Insight:  fair  Judgment:  fair  Oriented to:  time, person, and place  Attention Span and Concentration:  intact  Recent and Remote Memory:  intact  Language: Able to name objects, Able to repeat phrases and Able to read and write  Fund of Knowledge: appropriate  Muscle Strength and Tone: normal  Gait and Station: Normal         Discharge Plan:       Further instructions from your care team       Behavioral Discharge Planning and Instructions    Summary: You were admitted on 8/1/2022  due to Manic Symptomology.  You were treated by Debra Naegele, APRN and discharged on 8/5/2022 from 4A to Home    Main Diagnosis:   1.  Bipolar 1 disorder, most recent episode manic.  2.  Cannabis use disorder, severe.  3.  History of opiate use disorder with suboxone maintenance.    Health Care Follow-up:   Chemical Dependency Assessment:  Appointment Date/Time: Tuesday, August 9th @ 10:30am  (VIDEO APPOINTMENT)       CD : Zully Olmos     Address: [facility] (VIDEO APPOINTMENT)     Phone Number: [phone]    Psychiatry:     Appointment Date/Time: Thursday, September 15th @ 10:30am (IN-OFFICE APPOINTMENT)        Psychiatric Provider: TRACEE Anderson PA-C    Address: [facility]: [address] [place] [phone]  Phone: [phone]    [phone]    Individual Therapy:   A referral to [facility] was made on your behalf for Individual therapy. The are currently reviewing your information. A  staff member should reach out to soon to set up an INTAKE appointment. Please follow up with them if you do not hear from them with in the next 5-7 says.  Address: [address], [address] [place] [address]  Phone: [phone]  E-mail: [email or web address]    [facility]:   Offers; medical, dental, behavioral health, support services, also offer a Wellness Center  Address: [address], [place] [address]  Phone: [phone]  E-mail: [email or web address]    Chemical Dependency Referral:   [facility]:   Address : [address] [facility], [address], [place] [address]  Tel: [phone], [phone]    [facility] ([facility])  [facility] connects people seeking recovery to resources that help foster and sustain long-term recovery.  Whether you are seeking resources for treatment, transportation, housing, job training, education, health care or other pathways to recovery, [facility] is a great place to start.    Phone: 572.283.7261.  [email or web address] (Great listing of all types of recovery and non-recovery related resources)    Attend all scheduled appointments with your outpatient providers. Call at least 24 hours in advance if you need to reschedule an appointment to ensure continued access to your outpatient providers.     Major Treatments, Procedures and Findings:  You were provided with: a psychiatric assessment, assessed for medical stability, medication evaluation and/or management, and group therapy    Symptoms to Report: feeling more aggressive, increased confusion, losing more sleep, mood getting worse, or thoughts of suicide    Early warning signs can include: increased depression or anxiety sleep disturbances increased thoughts or behaviors of suicide or self-harm  increased unusual thinking, such as paranoia or hearing voices    Safety and Wellness:  Take all medicines as directed.  Make no changes unless your doctor suggests them.      Follow treatment  "recommendations.  Refrain from alcohol and non-prescribed drugs.  Ask your support system to help you reduce your access to items that could harm yourself or others. If there is a concern for safety, call 911.    Resources:   Crisis Intervention: 579.905.7435 or 114-341-4120 (TTY: 742.758.4487).  Call anytime for help.  National [place] on Mental Illness (www.[place].nika.org): 307.529.9495 or 514-758-7458.  Alcoholics Anonymous (www.alcoholics-anonymous.org): Check your phone book for your local chapter.  Self- Management and Recovery Training., [employer]-- Toll free: 205.917.1154  www.BufferBox  [facility] Crisis (COPE) Response - Adult 117 194-0206  Text 4 Life: txt \"LIFE\" to 41509 for immediate support and crisis intervention  Crisis text line: Text \"[place]\" to 155672. Free, confidential, 24/7.    General Medication Instructions:   See your medication sheet(s) for instructions.   Take all medicines as directed.  Make no changes unless your doctor suggests them.   Go to all your doctor visits.  Be sure to have all your required lab tests. This way, your medicines can be refilled on time.  Do not use any drugs not prescribed by your doctor.  Avoid alcohol.    Advance Directives:   Scanned document on file with [facility]? No scanned doc  Is document scanned? No. Copy Requested.  Honoring Choices Your Rights Handout: Informed and given  Was more information offered? Pt declined    The Treatment team has appreciated the opportunity to work with you. If you have any questions or concerns about your recent admission, you can contact the unit which can receive your call 24 hours a day, 7 days a week. They will be able to get in touch with a Provider if needed. The unit number is [phone] .            Attestation:  The patient has been seen and evaluated by me,  Debra A. Naegele, APRN CNS on 8/5/2022  Discharge summary time > 30 minutes   "

## 2022-08-05 NOTE — PLAN OF CARE
Goal Outcome Evaluation:           DISCHARGE:  This RN and pt have reviewed all meds and aftercare plan. All belongings returned. Pt denies SI , anxiety or depression at this time. Pt bright and smiling upon DC.  Dad has picked up.

## 2022-08-08 ENCOUNTER — MEDICAL CORRESPONDENCE (OUTPATIENT)
Dept: BEHAVIORAL HEALTH | Facility: CLINIC | Age: 22
End: 2022-08-08

## 2022-08-09 ENCOUNTER — HOSPITAL ENCOUNTER (OUTPATIENT)
Dept: BEHAVIORAL HEALTH | Facility: CLINIC | Age: 22
Discharge: HOME | End: 2022-08-09
Attending: FAMILY MEDICINE | Admitting: FAMILY MEDICINE
Payer: COMMERCIAL

## 2022-08-09 VITALS — HEIGHT: 64 IN | WEIGHT: 213 LBS | BODY MASS INDEX: 36.37 KG/M2

## 2022-08-09 PROCEDURE — H0001 ALCOHOL AND/OR DRUG ASSESS: HCPCS | Mod: GT,95

## 2022-08-09 ASSESSMENT — COLUMBIA-SUICIDE SEVERITY RATING SCALE - C-SSRS
5. HAVE YOU STARTED TO WORK OUT OR WORKED OUT THE DETAILS OF HOW TO KILL YOURSELF? DO YOU INTEND TO CARRY OUT THIS PLAN?: NO
2. HAVE YOU ACTUALLY HAD ANY THOUGHTS OF KILLING YOURSELF IN THE PAST MONTH?: NO
3. HAVE YOU BEEN THINKING ABOUT HOW YOU MIGHT KILL YOURSELF?: NO
1. IN THE PAST MONTH, HAVE YOU WISHED YOU WERE DEAD OR WISHED YOU COULD GO TO SLEEP AND NOT WAKE UP?: NO
4. HAVE YOU HAD THESE THOUGHTS AND HAD SOME INTENTION OF ACTING ON THEM?: NO
6. HAVE YOU EVER DONE ANYTHING, STARTED TO DO ANYTHING, OR PREPARED TO DO ANYTHING TO END YOUR LIFE?: NO

## 2022-08-09 ASSESSMENT — PAIN SCALES - GENERAL: PAINLEVEL: NO PAIN (0)

## 2022-08-09 NOTE — PROGRESS NOTES
[facility]  Provider Name:  Zully Hopper     Credentials:  HAILE JAIME    PATIENT'S NAME: Chino Ken  PREFERRED NAME: Chino  PRONOUNS: she/her/hers     MRN: [id number]  : 2000  ADDRESS: [address]  [facility] [address]  ACCT. NUMBER:  [id number]  DATE OF SERVICE: 22  START TIME: 10:30 am  END TIME: 11:34 am  LAST 4 SSN:  [id number]  INSURANCE: [facility]  PMI:  [id number]  PREFERRED PHONE: [phone]  May we leave a program related message: Yes  EMERGENCY CONTACT: Juan Ken, father, [phone]    SERVICE MODALITY:  Video Visit:      Provider verified identity through the following two step process.  Patient provided:  Patient  and Patient's last 4 digits of SSN    Telemedicine Visit: The patient's condition can be safely assessed and treated via synchronous audio and visual telemedicine encounter.      Reason for Telemedicine Visit: Patient has requested telehealth visit    Originating Site (Patient Location): Her mother's house    Distant Site (Provider Location): Provider Remote Setting- Home Office    Consent:  The patient/guardian has verbally consented to: the potential risks and benefits of telemedicine (video visit) versus in person care; bill my insurance or make self-payment for services provided; and responsibility for payment of non-covered services.     Patient would like the video invitation sent by:  My Chart    Mode of Communication:  Video Conference via Amwell    As the provider I attest to compliance with applicable laws and regulations related to telemedicine.    UNIVERSAL ADULT SUBSTANCE USE DISORDER DIAGNOSTIC ASSESSMENT    Identifying Information:  Patient is a 21 year old,  [place] individual.  The pronoun use throughout this assessment reflects the patient's chosen pronoun.  Patient was referred for an assessment by current Behavioral Health Provider.  Patient attended the session alone.    Chief Complaint:   The reason  "for seeking services at this time is: \"Treat bipolar\".  The problem(s) began 8/10/2021.  Patient has not attempted to resolve these concerns in the past.  Patient does not appear to be in severe withdrawal, an imminent safety risk to self or others, or requiring immediate medical attention and may proceed with the assessment interview.    Social/Family History:  Patient reported they grew up in [place].  They were raised by biological parents.  Parents  /  when patient was 19.  Patient has 1 sibling.  Patient reported that their childhood was \"I didn't like my childhood because some of the things I did as a child, I never really... I grew up too quickly.  I started to work my first job at 16.  I guess it was alright.  I don't remember what I was doing as a kid.  I only remember when I was 3 years old and I crossed the street to go to the park.\"  Patient said she was alone a lot and did not have friends.  Patient said she got friends in 8th grade.  Patient said she was bullied a lot due to her dark skin.  \"I wasn't someone that anyone desired.\"  Patient described their current relationships with family of origin as \"amazing.  My mom and my dad and I well all talk.\"      The patient describes their cultural background as [place].  Cultural influences and impact on patient's life structure, values, norms, and healthcare: Grew up in a Zoroastrian/american household.  Contextual influences on patient's health include: Individual Factors Mental Health and Substance Use.  Patient identified their preferred language to be English. Patient reported they does not need the assistance of an  or other support involved in therapy.  Patient reports they are involved in community of singh activities.  They reports spirituality impacts recovery in the following ways:  Patient attends the Jainism every Saturday and Sunday. Patient said if she doesn't pray she feels weird.  Patient said she is really close " to God.  Patient said she thinks that her mental health issues were due to not praying, but knows that it has nothing to do with her spirituality.    Patient reported had no significant delays in developmental tasks.   Patient's highest education level was some college.  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  Patient identified the following learning problems: attention and concentration.  Patient reports they are able to understand written materials.    Patient reported the following relationship history as never .  Patient's current relationship status is has a partner or significant other for 2 years.   Patient identified their sexual orientation as heterosexual.  Patient reported having 0 children.     Patient's current living/housing situation involves staying with someone.  The immediate members of family and household include her parents and Katie Ken, Nima,Sister and they report that housing is stable. Patient identified partner; mother; father; siblings as part of their support system.  Patient identified the quality of these relationships as stable and meaningful.      Patient reports engaging in the following recreational/leisure activities: coloring, painting, fun activities, watch shows, try new foods, reading.  Patient is currently unemployed.  Patient reports their income is obtained through employment.  Patient does identify finances as a current stressor.      Patient denies substance related arrests or legal issues.  They are not under any current court jurisdiction.     Patient's Strengths and Limitations:  Patient identified the following strengths or resources that will help them succeed in treatment: Nondenominational / Religious, singh / spirituality and family support. Things that may interfere with the patient's success in treatment include: few friends and financial hardship.     Assessments:  The following assessments were completed by patient for this visit:  PHQ2: No flowsheet  data found.  GAD2:   TASHA-2 8/7/2022   Feeling nervous, anxious, or on edge 1   Not being able to stop or control worrying 1   TASHA-2 Total Score 2     CAGE-AID:   CAGE-AID Total Score 8/7/2022   Total Score 3   Total Score MyChart 3 (A total score of 2 or greater is considered clinically significant)     PROMIS 10-Global Health (all questions and answers displayed):   PROMIS 10 8/7/2022   In general, would you say your health is: Very good   In general, would you say your quality of life is: Very good   In general, how would you rate your physical health? Very good   In general, how would you rate your mental health, including your mood and your ability to think? Very good   In general, how would you rate your satisfaction with your social activities and relationships? Poor   In general, please rate how well you carry out your usual social activities and roles Fair   To what extent are you able to carry out your everyday physical activities such as walking, climbing stairs, carrying groceries, or moving a chair? Moderately   How often have you been bothered by emotional problems such as feeling anxious, depressed or irritable? Always   How would you rate your fatigue on average? Very severe   How would you rate your pain on average?   0 = No Pain  to  10 = Worst Imaginable Pain 2   In general, would you say your health is: 4   In general, would you say your quality of life is: 4   In general, how would you rate your physical health? 4   In general, how would you rate your mental health, including your mood and your ability to think? 4   In general, how would you rate your satisfaction with your social activities and relationships? 1   In general, please rate how well you carry out your usual social activities and roles. (This includes activities at home, at work and in your community, and responsibilities as a parent, child, spouse, employee, friend, etc.) 2   To what extent are you able to carry out your everyday  physical activities such as walking, climbing stairs, carrying groceries, or moving a chair? 3   In the past 7 days, how often have you been bothered by emotional problems such as feeling anxious, depressed, or irritable? 5   In the past 7 days, how would you rate your fatigue on average? 5   In the past 7 days, how would you rate your pain on average, where 0 means no pain, and 10 means worst imaginable pain? 2   Global Mental Health Score 10   Global Physical Health Score 12   PROMIS TOTAL - SUBSCORES 22   Some recent data might be hidden     [place] Suicide Severity Rating Scale (Lifetime/Recent)  [place] Suicide Severity Rating (Lifetime/Recent) 5/8/2019 8/3/2020 8/2/2022 8/9/2022   Q1 Wished to be Dead (Past Month) no no no no   Q2 Suicidal Thoughts (Past Month) no no - no   Q3 Suicidal Thought Method - - - no   Q4 Suicidal Intent without Specific Plan - - - no   Q5 Suicide Intent with Specific Plan - - - no   Q6 Suicide Behavior (Lifetime) no - - no   Level of Risk per Screen - - - low risk     GAIN-sliding scale:  When was the last time that you had significant problems... 8/9/2022   with feeling very trapped, lonely, sad, blue, depressed or hopeless about the future? Past month   with sleep trouble, such as bad dreams, sleeping restlessly, or falling asleep during the day? Past Month   with feeling very anxious, nervous, tense, scared, panicked or like something bad was going to happen? Past month   with becoming very distressed & upset when something reminded you of the past? Past month   with thinking about ending your life or committing suicide? Never      When was the last time that you did the following things 2 or more times? 8/9/2022   Lied or conned to get things you wanted or to avoid having to do something? Never   Had a hard time paying attention at school, work or home? Past month   Had a hard time listening to instructions at school, work or home? Past month   Were a bully or threatened other  people? Never   Started physical fights with other people? Never       Personal and Family Medical History:  Patient does report a family history of mental health concerns.  Patient reports family history includes Anxiety Disorder in her father.      Patient reported the following previous mental health diagnoses: a bipolar disorder.  Patient reports their primary mental health symptoms include:  Mood instability, emotional dysregulation, irritability and these do impact her ability to function.   Patient received mental health services in the past: reports no services.  Psychiatric Hospitalizations: Psychiatric Hospitalizations: [facility] when  08/10/22.  Patient denies a history of civil commitment.  Current mental health services/providers include:  [facility], begins today.    Patient has not had a physical exam to rule out medical causes for current symptoms.  Date of last physical exam was greater than a year ago and client was encouraged to schedule an exam with PCP. The patient has a [place] Provider, who is named Clinic, [facility] [place].  Patient reports the following current medical concerns: fatigue and the following current dental concerns: cavities.  Patient denies any issues with pain. Patient denies pregnancy.  There are not significant appetite / nutritional concerns / weight changes.  Patient does not report a history of an eating disorder.  Patient does report a history of head injury / trauma / cognitive impairment.  Patient said she has hit her head on a pole when she was 6 years old, does not report LOC.    Patient reports current meds as:   Outpatient Medications Marked as Taking for the 8/9/22 encounter (Hospital Encounter) with Zully Hopper LADC   Medication Sig     lithium (ESKALITH CR/LITHOBID) 450 MG CR tablet Take 1 tablet (450 mg) by mouth every 12 hours     OLANZapine (ZYPREXA) 20 MG tablet Take 1 tablet (20 mg) by mouth At  Bedtime     Medication Adherence:  Patient reports taking prescribed medications as prescribed.    Patient Allergies:  No Known Allergies    Medical History:  History reviewed. No pertinent past medical history.    Substance Use:  Patient reported no family history of chemical health issues.  Patient has not received substance use disorder and/or gambling treatment in the past.  Patient has not ever been to detox.  Patient is not currently receiving any chemical dependency treatment. Patient reports no history of support group attendance.      Substance History of use Age of first use Pattern and duration of use (include amounts and frequency) Date of last use     Withdrawal potential Route of administration   Alcohol never used          Cannabis   used in the past 16 Current use: Patient was smoking marijuana every day.  Patient would spend $40-50 and it would last 2 weeks. Patient said she would smoke when she woke up and smoke 3 times throughout the day. 7/19/2022 No smoked     Amphetamines   never used        Cocaine/crack    never used        Hallucinogens never used          Inhalants never used          Heroin never used          Other Opiates never used        Benzodiazepine   never used        Barbiturates never used        Over the counter meds used in the past 20 Patient takes benadryl for allergies and Unisom to help sleep. 8/7/2020 No oral   Caffeine used in the past 6 Patient said she was drinking 1-2 pops per day.  8/9/2022 No oral   Nicotine  used in the past 18 Patient was using disposable vapes, Escobars, that would last 3 weeks. 7/19/2022 No inhaled   other substances not listed above:  Identify:  never used            Patient reported the following problems as a result of their substance use:academic; family problems; financial problems; relationship problems.  Patient is concerned about substance use. Patient reports her family was concerned about their substance use.  Patient reports their  "recovery goals are \"never again.  I feel embarrassed.  I think it was just a phase.  Drugs waste a lot of money.\"     Patient reports experiencing the following withdrawal symptoms within the past 12 months: none and the following within the past 30 days: headache and diminished appetite.   Patients reports urges to use Nicotine / Tobacco.  Patient reports )she has used more Cannabis/ Hashish and Nicotine / Tobacco than intended or over a longer period of time than intended. Patient reports she has not had unsuccessful attempts to cut down or control use of Cannabis/ Hashish and Nicotine / Tobacco.  Patient reports longest period of abstinence was 3 weeks and return to use was due to N/A, patient has not used again. Patient reports she has not needed to use more Cannabis/ Hashish and Nicotine / Tobacco to achieve the same effect.  Patient does not report diminished effect with use of same amount of Cannabis/ Hashish and Nicotine / Tobacco.     Patient does  report a great deal of time is spent in activities necessary to obtain, use, or recover from Cannabis/ Hashish and Nicotine / Tobacco effects.  Patient does  report important social, occupational, or recreational activities are given up or reduced because of Cannabis/ Hashish and Nicotine / Tobacco use.  Cannabis/ Hashish use is continued despite knowledge of having a persistent or recurrent physical or psychological problem that is likely to have caused or exacerbated by use.  Patient reports the following problem behaviors while under the influence of substances: patient said she was very paranoid when under the influence, patient said she lost touch with reality.     Patient reports substance use has not ever impacted their ability to function in a school setting. Patient reports substance use has not ever impacted their ability to function in a work setting.  Patients demographics and history impact their recovery in the following ways:  Patient is [place] and " her culture does not recognize her mental health struggles.  Patient reports engaging in the following recreation/leisure activities while using:  Patient would smoke in other people's car or at the park.  Patient would eat and listen to music.  Patient reports the following people are supportive of recovery: Patient's family.    Patient does not have a history of gambling concerns and/or treatment.  Patient does not have other addictive behaviors she is concerned about.      Dimension Scale Ratings:    Dimension 1 - Acute Intoxication/Withdrawal: 0 Client displays full functioning with good ability to tolerate and cope with withdrawal discomfort. No signs or symptoms of intoxication or withdrawal or resolving signs or symptoms.  Dimension 2 - Biomedical: 0 Client displays full functioning with good ability to cope with physical discomfort.  Dimension 3 - Emotional/Behavioral/Cognitive Conditions: 2 Client has difficulty with impulse control and lacks coping skills. Client has thoughts of suicide or harm to others without means; however, the thoughts may interfere with participation in some treatment activities. Client has difficulty functioning in significant life areas. Client has moderate symptoms of emotional, behavioral, or cognitive problems. Client is able to participate in most treatment activities.  Dimension 4 - Readiness to Change:  1 Client is motivated with active reinforcement, to explore treatment and strategies for change, but ambivalent about illness or need for change.  Dimension 5 - Relapse/Continued Use/ Continued Problem Potential: 2 (A) Client has minimal recognition and understanding of relapse and recidivism issues and displays moderate vulnerability for further substance use or mental health problems. (B) Client has some coping skills inconsistently applied.  Dimension 6 - Recovery Environment:  1 Client has passive social  or family and significant other are not interested in  the client's recovery. The client is engaged in structured meaningful activity.    Significant Losses / Trauma / Abuse / Neglect Issues:   Patient did not serve in the .  There are indications or report of significant loss, trauma, abuse or neglect issues related to: are no indications and client denies any losses, trauma, abuse, or neglect concerns.  Concerns for possible neglect are not present.     Safety Assessment:   Patient denies current homicidal ideation and behaviors.  Patient denies current self-injurious ideation and behaviors.    Patient denied risk behaviors associated with substance use.  Patient reported substance use associated with mental health symptoms.  Patient reports the following current concerns for their personal safety: None.  Patient reports there are not firearms in the house.     History of Safety Concerns:  Patient denied a history of homicidal ideation.     Patient denied a history of personal safety concerns.    Patient denied a history of assaultive behaviors.    Patient denied a history of sexual assault behaviors.     Patient denied a history of risk behaviors associated with substance use.  Patient reported a history of substance use associated with mental health symptoms.  Patient reports the following protective factors: forward/future oriented thinking, dedication to family/friends, safe and stable environment, effectively controls impulses, sense of belonging  , purpose  , help seeking behaviors when distressed  , abstinence from substances, agreement to use safety plan, daily obligations, structured day, uses community crisis resources, effective problem-solving skills, committment to well-being, sense of meaning, positive social skills, financial stability, strong sense of self-worth/esteem and sense of personal control or determination    Risk Plan:  See Recommendations for Safety and Risk Management Plan    Review of Symptoms per patient report:  Substance Use:   daily use, skipping school due to substance use, family relationship problems due to substance use and social problems related to substance use     Collateral Contact Summary:   Collateral contacts contributing to this assessment:  The patient's electronic medical records were reviewed at time of assessment.    No additional collateral data had been obtained at the time of this documentation.     If court related records were reviewed, summarize here: None    Information from collateral contacts supported/largely agreed with information from the client and associated risk ratings.    Information in this assessment was obtained from the medical record and provided by patient who is a fair historian.    Patient will have open access to their mental health medical record.    Diagnostic Criteria:   1.)  Substance is often taken in larger amounts or over a longer period than was intended.  Met for:  cannabis/THC and nicotine.  2.)  There is persistent desire or unsuccessful efforts to cut down or control use of the substance.  Met for:  cannabis/THC and nicotine.  3.)  A great deal of time is spent in activities necessary to obtain the substance, use the substance, or recover from its effects.  Met for:  cannabis/THC and nicotine.  5.)  Recurrent use of the substance resulting in a failure to fulfill major role obligations at work, school, or home.  Met for:  cannabis/THC.  6.)  Continued use of the substance despite having persistent or recurrent social or interpersonal problems caused or exacerbated by the effects of its use.  Met for:  cannabis/THC.  9.)  Use of the substance is continued despite knowledge of having a persistent or recurrent physical or psychological problem that is likely to have been cause or exacerbated by the substance.  Met for:  cannabis/THC.    As evidenced by self report and criteria, the patient meets the following DSM-5 Diagnoses: (Sustained by DSM-5 Criteria Listed Above)      Cannabis Use  Disorder Severe - 304.30 (F12.20)  Tobacco Use Disorder Moderate - 305.10 (F17.200)    Specify if: In early remission:  After full criteria for alcohol/drug use disorder were previously met, none of the criteria for alcohol/drug use disorder have been met for at least 3 months but for less than 12 months (with the exception that Criterion A4,  Craving or a strong desire or urge to use alcohol/drug  may be met).     In sustained remission:   After full criteria for alcohol use disorder were previously met, none of the criteria for alcohol/drug use disorder have been met at any time during a period of 12 months or longer (with the exception that Criterion A4,  Craving or strong desire or urge to use alcohol/drug  may be met).     Specify if:   This additional specifier is used if the individual is in an environment where access to alcohol is restricted.    Mild: Presence of 2-3 symptoms  Moderate: Presence of 4-5 symptoms  Severe: Presence of 6 or more symptoms    Recommendations:     1. Plan for Safety and Risk Management:  Recommended that patient call 911 or go to the local ED should there be a change in any of these risk factors..      Report to child / adult protection services was NA.     2. HIGINIO Recommendations:      1)  Attend 6 individual psychotherapy sessions that offer a chemical dependency component to it.  2)  Abstain from all mood-altering chemicals unless prescribed by a licensed provider.   3)  Attend a culturally appropriate weekly support group meetings, such as 12 step based (AA/NA), SMART Recovery, Health Realizations, and/or Refuge Recovery meetings.     4)  Continue to attend your scheduled individual psychotherapy appointments.     5)  Follow all the recommendations of your treatment/medical providers.  6)  Patient may benefit from obtaining a full mental health evaluation.    The patient has a history of opiate use and was give treatment options, including Medication Assisted Treatment, and  information on the risks of opiod use disorder including recognizing and responding to opiod overdose.  Patient denied any use of opiates during today's assessment but has been seen in the ED for opioid related issues approximately 1 year ago.    3.  Mental Health Referrals:     The patient would benefit from continuing to follow all of the recommendations of his mental health providers.    4. Cultural Concerns:    The patient identified mental health concerns with a cultural influence will be addressed by referral to culturally appropriate providers.      5. Recommendations for treatment focus:      Alcohol / Substance Use - See #2. HIGINIO Referrals above for details on recommendations.  Mood Instability - See #3. Mental Health Referrals above for details.    6.  Referrals:   [facility]  [address]  [place] [address]  Phone: [phone]  Fax: [phone]    Clinical Substantiation:     Met with patient individually on 8/9/22 for comprehensive assessment including summary of assessment and conclusion, assessment risk and appropriate steps taken, documentation to support the diagnosis, rationale for disposition and appropriate level of care recommended and alternative for treatment options.  Patient endorses marijuana and nicotine use.  Patient has a history of opiate use in EHR, but patient denies ever using fentanyl at today's assessment.  Patient expresses shame and guilt over substance use and said she has individual counseling sessions set up.  Patient said that she did go to inpatient treatment for her mental health last year and was discharged on August 24, 2020 completing successfully.  Patient has a history of mental health issues and her symptoms are exacerbated by substance use.  Patient expresses desire to be sober and stay that way for her mental health and well being.  Patient may benefit from group therapy but still finds it difficult to pay attention and  track conversations, as well as difficulty listening and following instructions.  The patient is encouraged to attend sober support groups through [facility] and engage in individual therapy.    Rational for recommended level of care: The patient lacks sober coping skills, lacks awareness regarding the disease model of addiction, lacks a sober peer support network, has dual issues of mental health and substance abuse and has mental health issues which are exacerbated by substance abuse.    The patient reported she is willing to follow the above recommendations.    The patient would like the following family or other support people involved in their treatment:  None at this time.    DAANES Assessment ID: [id number]    Provider Name/ Credentials:  Zully Hopper MA, [facility] [place]  August 9, 2022

## 2022-08-21 ENCOUNTER — HEALTH MAINTENANCE LETTER (OUTPATIENT)
Age: 22
End: 2022-08-21

## 2022-11-21 ENCOUNTER — HEALTH MAINTENANCE LETTER (OUTPATIENT)
Age: 22
End: 2022-11-21

## 2023-09-17 ENCOUNTER — HEALTH MAINTENANCE LETTER (OUTPATIENT)
Age: 23
End: 2023-09-17

## 2024-05-07 ENCOUNTER — TELEPHONE (OUTPATIENT)
Dept: ENDOCRINOLOGY | Facility: CLINIC | Age: 24
End: 2024-05-07
Payer: COMMERCIAL

## 2024-05-10 ENCOUNTER — OFFICE VISIT (OUTPATIENT)
Dept: ENDOCRINOLOGY | Facility: CLINIC | Age: 24
End: 2024-05-10
Payer: COMMERCIAL

## 2024-05-10 VITALS
OXYGEN SATURATION: 97 % | SYSTOLIC BLOOD PRESSURE: 132 MMHG | BODY MASS INDEX: 47.66 KG/M2 | HEIGHT: 63 IN | DIASTOLIC BLOOD PRESSURE: 84 MMHG | WEIGHT: 269 LBS | HEART RATE: 105 BPM

## 2024-05-10 DIAGNOSIS — K59.00 CONSTIPATION, UNSPECIFIED CONSTIPATION TYPE: ICD-10-CM

## 2024-05-10 DIAGNOSIS — R73.03 PREDIABETES: ICD-10-CM

## 2024-05-10 DIAGNOSIS — E66.01 CLASS 3 SEVERE OBESITY WITH SERIOUS COMORBIDITY AND BODY MASS INDEX (BMI) OF 45.0 TO 49.9 IN ADULT, UNSPECIFIED OBESITY TYPE (H): Primary | ICD-10-CM

## 2024-05-10 DIAGNOSIS — E66.813 CLASS 3 SEVERE OBESITY WITH SERIOUS COMORBIDITY AND BODY MASS INDEX (BMI) OF 45.0 TO 49.9 IN ADULT, UNSPECIFIED OBESITY TYPE (H): Primary | ICD-10-CM

## 2024-05-10 PROCEDURE — 99204 OFFICE O/P NEW MOD 45 MIN: CPT

## 2024-05-10 RX ORDER — POLYETHYLENE GLYCOL 3350 17 G/17G
1 POWDER, FOR SOLUTION ORAL DAILY
Qty: 578 G | Refills: 2 | Status: SHIPPED | OUTPATIENT
Start: 2024-05-10

## 2024-05-10 RX ORDER — SEMAGLUTIDE 0.25 MG/.5ML
0.25 INJECTION, SOLUTION SUBCUTANEOUS WEEKLY
Qty: 2 ML | Refills: 0 | Status: SHIPPED | OUTPATIENT
Start: 2024-05-10

## 2024-05-10 RX ORDER — SEMAGLUTIDE 0.5 MG/.5ML
0.5 INJECTION, SOLUTION SUBCUTANEOUS WEEKLY
Qty: 2 ML | Refills: 0 | Status: SHIPPED | OUTPATIENT
Start: 2024-05-10

## 2024-05-10 ASSESSMENT — SLEEP AND FATIGUE QUESTIONNAIRES
HOW LIKELY ARE YOU TO NOD OFF OR FALL ASLEEP WHILE WATCHING TV: SLIGHT CHANCE OF DOZING
HOW LIKELY ARE YOU TO NOD OFF OR FALL ASLEEP WHILE SITTING AND READING: SLIGHT CHANCE OF DOZING
HOW LIKELY ARE YOU TO NOD OFF OR FALL ASLEEP WHILE SITTING INACTIVE IN A PUBLIC PLACE: SLIGHT CHANCE OF DOZING
HOW LIKELY ARE YOU TO NOD OFF OR FALL ASLEEP WHILE SITTING QUIETLY AFTER LUNCH WITHOUT ALCOHOL: SLIGHT CHANCE OF DOZING
HOW LIKELY ARE YOU TO NOD OFF OR FALL ASLEEP WHILE SITTING AND TALKING TO SOMEONE: SLIGHT CHANCE OF DOZING
HOW LIKELY ARE YOU TO NOD OFF OR FALL ASLEEP WHEN YOU ARE A PASSENGER IN A CAR FOR AN HOUR WITHOUT A BREAK: SLIGHT CHANCE OF DOZING
HOW LIKELY ARE YOU TO NOD OFF OR FALL ASLEEP IN A CAR, WHILE STOPPED FOR A FEW MINUTES IN TRAFFIC: SLIGHT CHANCE OF DOZING
HOW LIKELY ARE YOU TO NOD OFF OR FALL ASLEEP WHILE LYING DOWN TO REST IN THE AFTERNOON WHEN CIRCUMSTANCES PERMIT: SLIGHT CHANCE OF DOZING

## 2024-05-10 ASSESSMENT — PAIN SCALES - GENERAL: PAINLEVEL: NO PAIN (0)

## 2024-05-10 NOTE — PATIENT INSTRUCTIONS
"Thank you for allowing us the privilege of caring for you. We hope we provided you with the excellent service you deserve.   Please let us know if there is anything else we can do for you so that we can be sure you are completely satisfied with your care experience.    To ensure the quality of our services you may be receiving a patient satisfaction survey from an independent patient satisfaction monitoring company.    The greatest compliment you can give is a \"Likely to Recommend\"    Your visit was with Akua Parra PA-C today.    Instructions per today's visit:     Lonnie Ken, it was great to visit with you today.  Here is a review of our visit.  If our clinic scheduler is not able to reach you please call [phone] to schedule your next appointments.    Plan  Start Wegovy 0.25mg once weekly for 4 weeks, then increase to 0.5mg once weekly.   Alternatives discussed: metformin   Start 1 capful miralax daily for constipation symptoms, stop if your bowel movements are too loose   Goals we discussed today:   Cutting out all sugary drinks   Drinking more water   Walking 3-4 blocks every day   Labs ordered today: none   [facility] pharmacy in 6 weeks to check in on wegovy start   Follow up with Akua in 3 months   Dietician appointment next week     Information about Video Visits with [facility]: video visit information  _________________________________________________________________________________________________________________________________________________________  If you are asked by your clinic team to have your blood pressure checked:  [facility] do offer several locations for blood pressure checks. Please follow the below link to schedule an appointment. Scheduling an appointment at the pharmacy for a blood pressure check is now preferred.    Appointment Plus " ([email or web address])  _________________________________________________________________________________________________________________________________________________________  Important contact and scheduling information:  Please call our contact center at [phone] to schedule your next appointments.  To find a lab location near you, please call [phone].  For any nursing questions or concerns call Angela Redmond LPN at [phone] or Irasema Chery RN at [phone]  Please call during clinic hours Monday through Friday 8:00a - 4:00p if you have questions or you can contact us via [employer] at anytime and we will reply during clinic hours.    Lab results will be communicated through My Chart or letter (if My Chart not used). Please call the clinic if you have not received communication after 1 week or if you have any questions.?  Clinic Fax: [phone]    _________________________________________________________________________________________________________________________________________________________  Meal Replacement Products:    Here is the link to our new e-store where you can purchase our meal replacement products    [facility] E-Store  [facility]/store    The one week starter kit is a great way to sample a variety of products and see what works for you.    If you want more information about the product go to: [employer] Meals  [email or web address]    If you are an employee or [facility] or [facility] please contact your care team for a 10% estore discount    Free Shipping for orders over $75     Benefits of meal replacements products:    Portion and calorie control  Improved nutrition  Structured eating  Simplified food choices  Avoid contact with trigger foods  _________________________________________________________________________________________________________________________________________________________  Interested in working with a health  ?  Health coaches work with you to improve your overall health and wellbeing.  They look at the whole person, and may involve discussion of different areas of life, including, but not limited to the four pillars of health (sleep, exercise, nutrition, and stress management). Discuss with your care team if you would like to start working a health .  Health Coaching-3 Pack: Schedule by calling [phone]    $99 for three health coaching visits    Visits may be done in person or via phone    Coaching is a partnership between the  and the client; Coaches do not prescribe or diagnose    Coaching helps inspire the client to reach his/her personal goals   _________________________________________________________________________________________________________________________________________________________  24 Week Healthy Lifestyle Plan:    Our mission in the 24-week Healthy Lifestyle Plan is to provide you with individualized care by giving you the tools, education and support you need to lose weight and maintain a healthy lifestyle. In your 24-week journey, you ll be supported by a dedicated weight loss team that includes registered dietitians, medical weight management providers, health coaches, and nurses -- all with special expertise in weight loss -- to help you every step of the way.     Monthly meetings with your registered dietician or medical weight management provider help to review your progress, update your care plan, and make any adjustments needed to ensure success. Between these visits, weekly and bi-weekly health  visits will help you focus on the four pillars of weight loss -- stress, sleep, nutrition, and exercise -- and how you can best adapt each to achieve sustainable weight loss results.    In addition, you will be given exclusive access to online wellbeing classes through [employer].  Your initial visit will be with a medical weight management provider who will help to  understand your weight loss goals and ensure this program is the right fit for you. Please let our team know if you are interested in the 24 week plan by sending a message to your care team or calling [phone] to schedule.  _________________________________________________________________________________________________________________________________________________________  __________  [facility] Get Moving Program  Our team of physical therapists is trained to help you understand and take control of your condition. They will perform a thorough evaluation to determine your ability for activity and develop a customized plan to fit your goals and physical ability.  Scheduling: Unsure if the Get Moving program is right for you? Discuss the program with your medical provider or diabetes educator. You can also call us at [phone] to ask questions or schedule an appointment.   [facility] Get Moving Program  ____________________________________________________________________________________________________________________________________________________________________________  [facility] Diabetes Prevention Program (DPP)  If you have prediabetes and Medicare please contact us via [employer] to learn more about the Diabetes Prevention Program (DPP)  Program Details:   [facility] offers the year-long Diabetes Prevention Program (DPP). The program helps you to make lifestyle changes that prevent or delay type 2 diabetes by supporting healthy eating, increased physical activity, stress reduction and use of coping skills.   On average, previous [facility] DPP cohorts have lost and maintained at least 5% of their starting weight throughout the program and averaged more than 150 minutes of physical activity per week.  Participants meet weekly for one-hour group sessions over sixteen weeks, every other week for the next 8 weeks, and monthly for the last six months.   A year-long  maintenance program is also available for participants who complete the first year.   Location & Cost:   During the COVID-19 Public Health Emergency, the program is offered virtually. When in-person classes can resume, they will be held at [facility].  For people with Medicare, the program is covered in full. A self-pay option will also be available for those with non-Medicare insurance plans.   ______________________________________________________________________________________________________________________________________________________________________________________________________________________________    To work with a Behavioral Health Psychologist:    Call to schedule:    Omi Rodriguez - [phone]  Yanick Zapien - [phone]  Scarlet Dumont - [phone]  Faiza Alvarez - [phone]   Karley Vargas PhD (cannot accept Medicare) [phone]        Thank you,   [facility] Comprehensive Weight Management Team

## 2024-05-10 NOTE — LETTER
"5/10/2024       RE: Chino Ken  [address]   [id number]  [facility] [id number]     Dear Colleague,    Thank you for referring your patient, Chino Ken, to the [facility]. Please see a copy of my visit note below.    49 minutes spent by me on the date of the encounter doing chart review, history and exam, documentation and further activities per the note    New Medical Weight Management Consult    PATIENT:  Chino Ken  MRN:         [id number]  :         2000  JOSÉ MIGUEL:         5/10/2024    Dear PCP,    I had the pleasure of seeing your patient, Chino Ken. Full intake/assessment was done to determine barriers to weight loss success and develop a treatment plan. Chino Ken is a 23 year old female interested in treatment of medical problems associated with excess weight. She has a height of 5' 3\", a weight of 269 lbs 0 oz, and the calculated Body mass index is 47.65 kg/m .            Assessment & Plan  Problem List Items Addressed This Visit    None  Visit Diagnoses       Class 3 severe obesity with serious comorbidity and body mass index (BMI) of 45.0 to 49.9 in adult, unspecified obesity type (H)    -  Primary    Relevant Medications    polyethylene glycol (MIRALAX) 17 GM/Dose powder    Semaglutide-Weight Management (WEGOVY) 0.25 MG/0.5ML pen    Semaglutide-Weight Management (WEGOVY) 0.5 MG/0.5ML pen    Other Relevant Orders    Med Therapy Management Referral    Prediabetes        Relevant Medications    polyethylene glycol (MIRALAX) 17 GM/Dose powder    Semaglutide-Weight Management (WEGOVY) 0.25 MG/0.5ML pen    Semaglutide-Weight Management (WEGOVY) 0.5 MG/0.5ML pen    Other Relevant Orders    Med Therapy Management Referral    Constipation, unspecified constipation type        Relevant Medications    polyethylene glycol (MIRALAX) 17 GM/Dose powder             Plan  Start Wegovy 0.25mg once weekly " for 4 weeks, then increase to 0.5mg once weekly.   Alternatives discussed: metformin   Start 1 capful miralax daily for constipation symptoms, stop if your bowel movements are too loose   Goals we discussed today:   Cutting out all sugary drinks   Drinking more water   Walking 3-4 blocks every day   Labs ordered today: none   [facility] pharmacy in 6 weeks to check in on wegovy start   Follow up with Akua in 3 months   Dietician appointment next week         Anti-obesity medication started today for this patient   WEGOVY  Will see great benefit with appetite, blood sugar management, craving reduction  Not concurrently taking a different GLP-1   No history of pancreatitis   No personal or family history of medullary thyroid carcinoma  No personal or family history of Multiple Endocrine Neoplasia Type 2  On birth control : oral contaceptives   .      Potential anti-obesity medications for this patient the future if there are issues with cost or side effects  METFORMIN  Has diagnosis of prediabetes   Last A1C : 6.0  No history of chronic kidney disease  GFR >45  .    The following medications could be considered with caution for this patient   QSYMIA  No history of kidney stones  No history of glaucoma   No issues with memory  No chronic kidney disease   No history of cardiovascular disease  No history of cardiac arrhthymias  No history of drug abuse  No history of hypertension  Caution would be needed with this medication due to complex mental health history, would need to collaborate with psychiatrist prior to starting   .  YOUNG  Has taken wellbutrin the past, per chart she didn't like how she felt, however it has been several years, could consider revisiting.  No current opioid use  No history of liver disease  No chronic pain  No history of hypertension  No history of cardiovascular disease   No history of cardiac arrhythmias   No history of seizures  No history of bulimia nervosa  No history of anorexia  "nervosa  Caution would be needed with this medication due to diagnosed bipolar disorder, could consider in collaboration with psychiatrist   .             Discussed that wegovy is not safe during pregnancy. Chino expressed understanding.         Chino Ken is a 23 year old female who presents to clinic today for the following health issues.     She has the following co-morbidities:        5/10/2024     1:38 PM   --   I have the following health issues associated with obesity Pre-Diabetes    Polycystic Ovarian Syndrome   I have the following symptoms associated with obesity Knee Pain    Back Pain    Fatigue    Irregular Menstral Cycle            No data to display                    5/10/2024     1:38 PM   Referring Provider   Please name the provider who referred you to Medical Weight Management  If you do not know, please answer \"I Don't Know\" fred       Overweight onset 1 year ago with starting lithium for bipolar disorder, prior to 1 year ago estimates she weighed 220lbs felt good at this weight, saw rapid weight gain to 260lbs. Felt that lithium increased her appetite, worsened fatigue.     Past strategies to lose weight: exercising, drinking more water, walking more, reducing sweets, but has not seen successful weight loss with this.   Past obstacles in weight loss: fast food craving, emotional eating.     Highest weight in life is current weight today, 269lbs.     Comorbidities associated with weight gain include prediabetes (A1c 6.0 5/1/24), pcos, dyslipidemia,   Additional health issues include bipolar disorder (is unsure if this is accurate, taking olanzapine 20mg but otherwise no medications).  Working with psychiatrist Yesy Gamboa through [facility].     Motivators for weight loss include improve overall health, exercise tolerance, reduce risk for health problems in the future, manage prediabetes.     She is interested in starting a medication as a tool for working towards sustainable weight " "loss.    Regarding eating patterns and diet, she  typically eats 1-2 meals a day. Craves sweets . Is able to get full. Can sometimes stay full until next meal, will feel hungry 4 hours after feeling full. Does struggle with portion control. Does experience food noise. Does not experience emotional eating, wonders if she used to in the past, is unsure . Does not experience a loss of control around eating. Describes one episode of making herself throw up after overeating due to nausea after overeating.       Eats out/ gets take out 7+ times a week. Drinks water, soda (regular pepsi, coke, sprite) 1-2 20oz bottles a day.     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 Is often on her feet at work, feels like she is constantly on the move. Walking a lot. Really likes her work.         Past AOMs  Wellbutrin- took in the past (2020_ for mood, per chart she did not like how she felt on it, felt too \"mellow\"            5/10/2024     1:38 PM   Weight History   How concerned are you about your weight? Very Concerned   I became overweight As an Adult   The following factors have contributed to my weight gain Mental Health Issues    Started on Medication that Caused Weight Gain    Eating Wrong Types of Food    Eating Too Much    Lack of Exercise    Stress   My lowest weight since age 18 was 170   My highest weight since age 18 was 210   The most weight I have ever lost was (lbs) 50   I have the following family history of obesity/being overweight I am the only one in my immediate family who is overweight   How has your weight changed over the last year? Gained           5/10/2024     1:38 PM   Diet Recall Review with Patient   If you do eat lunch, what types of food do you typically eat? sandwiches   How many cans/bottles of sugar pop/soda/tea/sports drinks do you drink in a day? 1   How many cans/bottles of diet pop/soda/tea or sports drink do you drink " in a day? 1   How often do you have a drink of alcohol? Never           5/10/2024     1:38 PM   Eating Habits   Generally, my meals include foods like these bread, pasta, rice, potatoes, corn, crackers, sweet dessert, pop, or juice Almost Everyday   Generally, my meals include foods like these fried meats, brats, burgers, french fries, pizza, cheese, chips, or ice cream Almost Everyday   Eat fast food (like McDonal[employer], [employer], Taco Bell) Almost Everyday   Eat at a buffet or sit-down restaurant Once a Week   Rarely sit down for a meal but snack or graze throughout Never   Eat extra snacks between meals Never   Eat most of my food at the end of the day A Few Times a Week   Eat in the middle of the night or wake up at night to eat Never   Eat extra snacks to prevent or correct low blood sugar Never   Eat to prevent acid reflux or stomach pain Never   Worry about not having enough food to eat Never   I eat when I am depressed Never   I eat when I am stressed Once a Week   I finish all the food on my plate even if I am already full A Few Times a Week   I can't resist eating delicious food or walk past the good food/smell Everyday   I eat/snack without noticing that I am eating Never   I eat when I am preparing the meal Once a Week   I eat more than usual when I see others eating Less Than Weekly   I have trouble not eating sweets, ice cream, cookies, or chips if they are around the house Less Than Weekly   I think about food all day Almost Everyday   What foods, if any, do you crave? Sweets/Candy/Chocolate           5/10/2024     1:38 PM   Amount of Food   I feel out of control when eating Never   I eat a large amount of food, like a loaf of bread, a box of cookies, a pint/quart of ice cream, all at once Almost Everyday   I eat a large amount of food even when I am not hungry Never   I eat rapidly Everyday   I eat alone because I feel embarrassed and do not want others to see how much I have eaten Monthly   I eat  until I am uncomfortably full Never   I feel bad, disgusted, or guilty after I overeat Monthly           5/10/2024     1:38 PM   Activity/Exercise History   How much of a typical 12 hour day do you spend sitting? Most of the Day   How much of a typical 12 hour day do you spend lying down? Less Than Half the Day   How much of a typical day do you spend walking/standing? Most of the Day   How many hours (not including work) do you spend on the TV/Video Games/Computer/Tablet/Phone? 4-5 Hours   How many times a week are you active for the purpose of exercise? Once a Week   What keeps you from being more active? Shortness of Breath    Lack of Time   How many total minutes do you spend doing some activity for the purpose of exercising when you exercise? 15-30 Minutes       PAST MEDICAL HISTORY:  No past medical history on file.        5/10/2024     1:38 PM   Work/Social History Reviewed With Patient   My employment status is Full-Time   My job is [employer]   How much of your job is spent on the computer or phone? 100%   How many hours do you spend commuting to work daily? 10   What is your marital status? Single   Who do you live with? spouse   Who does the food shopping? me       Marijuana use: none, quit last year. Suspects it was worsening her mental health.   Alcohol use: none   Caffeine use: soda             5/10/2024     1:38 PM   Mental Health History Reviewed With Patient   Have you ever been physically or sexually abused? No   How often in the past 2 weeks have you felt little interest or pleasure in doing things? Not at all   Over the past 2 weeks how often have you felt down, depressed, or hopeless? Not at all             5/10/2024     1:38 PM   Sleep History Reviewed With Patient   How many hours do you sleep at night? 7         MEDICATIONS:   Current Outpatient Medications   Medication Sig Dispense Refill    OLANZapine (ZYPREXA) 20 MG tablet Take 1 tablet (20 mg) by mouth At Bedtime 30 tablet 1    polyethylene  "glycol (MIRALAX) 17 GM/Dose powder Take 17 g (1 Capful) by mouth daily 578 g 2    Semaglutide-Weight Management (WEGOVY) 0.25 MG/0.5ML pen Inject 0.25 mg Subcutaneous once a week For 4 weeks 2 mL 0    Semaglutide-Weight Management (WEGOVY) 0.5 MG/0.5ML pen Inject 0.5 mg Subcutaneous once a week After completing 4 weeks of 0.25mg dose 2 mL 0    cetirizine (ZYRTEC) 10 MG tablet Take 10 mg by mouth daily (Patient not taking: Reported on 8/9/2022)      hydrOXYzine (ATARAX) 50 MG tablet Take 50 mg by mouth every 8 hours as needed for itching (Patient not taking: Reported on 5/10/2024)      lithium (ESKALITH CR/LITHOBID) 450 MG CR tablet Take 1 tablet (450 mg) by mouth every 12 hours (Patient not taking: Reported on 5/10/2024) 60 tablet 1           ALLERGIES:   No Known Allergies        5/10/2024     1:21 PM   JIMBO Score (Last Two)   JIMBO Raw Score 40   Activation Score 100   JIMBO Level 4               Objective   /84 (BP Location: Left arm, Patient Position: Sitting, Cuff Size: Adult Large)   Pulse 105   Ht 1.6 m (5' 3\")   Wt 122 kg (269 lb)   SpO2 97%   BMI 47.65 kg/m    /84 (BP Location: Left arm, Patient Position: Sitting, Cuff Size: Adult Large)   Pulse 105   Ht 1.6 m (5' 3\")   Wt 122 kg (269 lb)   SpO2 97%   BMI 47.65 kg/m    Body mass index is 47.65 kg/m .  Physical Exam   GENERAL: alert and no distress  EYES: Eyes grossly normal to inspection.  No discharge or erythema, or obvious scleral/conjunctival abnormalities.  RESP: No audible wheeze, cough, or visible cyanosis.    SKIN: Visible skin clear. No significant rash, abnormal pigmentation or lesions.  NEURO: Cranial nerves grossly intact.  Mentation and speech appropriate for age.  PSYCH: Appropriate affect, tone, and pace of words     Anti-obesity medication ROS:    HEENT  Hx of glaucoma: No    Cardiovascular  CAD:No  HTN:No      Gastrointestinal  GERD:No  Constipation:Yes Bms once a day but is straining to have bowel movements   Liver " Dz:No  H/O Pancreatitis:No    Psychiatric  Bipolar: Yes  Anxiety:Yes  Depression:No , denies suicidal ideation   History of alcohol/drug abuse: No  Hx of eating disorder:No    Endocrine  Personal or family hx of MTC or MEN2:No  Diabetes/prediabetes: Yes    Neurologic:  Hx of seizures: No  Hx of migraines: Yes every few months   Memory Impairment:  is forgetful, wonders if it's related to adhd   CVA history: No      History of kidney stones: No  Kidney disease: No  Current birth control: Yes : condoms and oral contraceptives   Interested in future pregnancy: in the future.     Taking Opioid/Narcotic: No        Sincerely,    Akua Parra PA-C

## 2024-05-10 NOTE — NURSING NOTE
"(   Chief Complaint   Patient presents with    New Patient     Weight management    )    ( Weight: 122 kg (269 lb) )  ( Height: 160 cm (5' 3\") )  ( BMI (Calculated): 47.65 )  (   )  ( Cumulative weight loss (lbs): 0 )  (   )  (   )  (   )  (   )    ( BP: 132/84 )  (   )  (   )  (   )  ( Pulse: 105 )  (   )  ( SpO2: 97 % )    (   Patient Active Problem List   Diagnosis    Bipolar disorder (H)    )  (   Current Outpatient Medications   Medication Sig Dispense Refill    OLANZapine (ZYPREXA) 20 MG tablet Take 1 tablet (20 mg) by mouth At Bedtime 30 tablet 1    cetirizine (ZYRTEC) 10 MG tablet Take 10 mg by mouth daily (Patient not taking: Reported on 8/9/2022)      hydrOXYzine (ATARAX) 50 MG tablet Take 50 mg by mouth every 8 hours as needed for itching (Patient not taking: Reported on 5/10/2024)      lithium (ESKALITH CR/LITHOBID) 450 MG CR tablet Take 1 tablet (450 mg) by mouth every 12 hours (Patient not taking: Reported on 5/10/2024) 60 tablet 1    )  ( Diabetes Eval:    )    ( Pain Eval:  No Pain (0) )    ( Wound Eval:       )    (   History   Smoking Status    Former   Smokeless Tobacco    Former    )    ( Signed By:  Josey Corcoran; May 10, 2024; 1:45 PM )    "

## 2024-05-10 NOTE — PROGRESS NOTES
"49 minutes spent by me on the date of the encounter doing chart review, history and exam, documentation and further activities per the note    New Medical Weight Management Consult    PATIENT:  Chino Ken  MRN:         [id number]  :         2000  JOSÉ MIGUEL:         5/10/2024    Dear PCP,    I had the pleasure of seeing your patient, Chino Ken. Full intake/assessment was done to determine barriers to weight loss success and develop a treatment plan. Chino Ken is a 23 year old female interested in treatment of medical problems associated with excess weight. She has a height of 5' 3\", a weight of 269 lbs 0 oz, and the calculated Body mass index is 47.65 kg/m .            Assessment & Plan   Problem List Items Addressed This Visit    None  Visit Diagnoses       Class 3 severe obesity with serious comorbidity and body mass index (BMI) of 45.0 to 49.9 in adult, unspecified obesity type (H)    -  Primary    Relevant Medications    polyethylene glycol (MIRALAX) 17 GM/Dose powder    Semaglutide-Weight Management (WEGOVY) 0.25 MG/0.5ML pen    Semaglutide-Weight Management (WEGOVY) 0.5 MG/0.5ML pen    Other Relevant Orders    Med Therapy Management Referral    Prediabetes        Relevant Medications    polyethylene glycol (MIRALAX) 17 GM/Dose powder    Semaglutide-Weight Management (WEGOVY) 0.25 MG/0.5ML pen    Semaglutide-Weight Management (WEGOVY) 0.5 MG/0.5ML pen    Other Relevant Orders    Med Therapy Management Referral    Constipation, unspecified constipation type        Relevant Medications    polyethylene glycol (MIRALAX) 17 GM/Dose powder             Plan  Start Wegovy 0.25mg once weekly for 4 weeks, then increase to 0.5mg once weekly.   Alternatives discussed: metformin   Start 1 capful miralax daily for constipation symptoms, stop if your bowel movements are too loose   Goals we discussed today:   Cutting out all sugary drinks   Drinking more water   Walking 3-4 blocks every day   Labs ordered today: none "   [facility] pharmacy in 6 weeks to check in on wegovy start   Follow up with Akua in 3 months   Dietician appointment next week         Anti-obesity medication started today for this patient   WEGOVY  Will see great benefit with appetite, blood sugar management, craving reduction  Not concurrently taking a different GLP-1   No history of pancreatitis   No personal or family history of medullary thyroid carcinoma  No personal or family history of Multiple Endocrine Neoplasia Type 2  On birth control : oral contaceptives   .      Potential anti-obesity medications for this patient the future if there are issues with cost or side effects  METFORMIN  Has diagnosis of prediabetes   Last A1C : 6.0  No history of chronic kidney disease  GFR >45  .    The following medications could be considered with caution for this patient   QSYMIA  No history of kidney stones  No history of glaucoma   No issues with memory  No chronic kidney disease   No history of cardiovascular disease  No history of cardiac arrhthymias  No history of drug abuse  No history of hypertension  Caution would be needed with this medication due to complex mental health history, would need to collaborate with psychiatrist prior to starting   .  YOUNG  Has taken wellbutrin the past, per chart she didn't like how she felt, however it has been several years, could consider revisiting.  No current opioid use  No history of liver disease  No chronic pain  No history of hypertension  No history of cardiovascular disease   No history of cardiac arrhythmias   No history of seizures  No history of bulimia nervosa  No history of anorexia nervosa  Caution would be needed with this medication due to diagnosed bipolar disorder, could consider in collaboration with psychiatrist   .             Discussed that wegovy is not safe during pregnancy. Chino expressed understanding.         Chino DONTE Suellen is a 23 year old female who presents to clinic today for the following health  "issues.     She has the following co-morbidities:        5/10/2024     1:38 PM   --   I have the following health issues associated with obesity Pre-Diabetes    Polycystic Ovarian Syndrome   I have the following symptoms associated with obesity Knee Pain    Back Pain    Fatigue    Irregular Menstral Cycle            No data to display                    5/10/2024     1:38 PM   Referring Provider   Please name the provider who referred you to Medical Weight Management  If you do not know, please answer \"I Don't Know\" fred       Overweight onset 1 year ago with starting lithium for bipolar disorder, prior to 1 year ago estimates she weighed 220lbs felt good at this weight, saw rapid weight gain to 260lbs. Felt that lithium increased her appetite, worsened fatigue.     Past strategies to lose weight: exercising, drinking more water, walking more, reducing sweets, but has not seen successful weight loss with this.   Past obstacles in weight loss: fast food craving, emotional eating.     Highest weight in life is current weight today, 269lbs.     Comorbidities associated with weight gain include prediabetes (A1c 6.0 5/1/24), pcos, dyslipidemia,   Additional health issues include bipolar disorder (is unsure if this is accurate, taking olanzapine 20mg but otherwise no medications).  Working with psychiatrist Yesy Gamboa through [facility].     Motivators for weight loss include improve overall health, exercise tolerance, reduce risk for health problems in the future, manage prediabetes.     She is interested in starting a medication as a tool for working towards sustainable weight loss.    Regarding eating patterns and diet, she  typically eats 1-2 meals a day. Craves sweets . Is able to get full. Can sometimes stay full until next meal, will feel hungry 4 hours after feeling full. Does struggle with portion control. Does experience food noise. Does not experience emotional eating, wonders if she used to in the " "past, is unsure . Does not experience a loss of control around eating. Describes one episode of making herself throw up after overeating due to nausea after overeating.       Eats out/ gets take out 7+ times a week. Drinks water, soda (regular pepsi, coke, sprite) 1-2 20oz bottles a day.     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 Is often on her feet at work, feels like she is constantly on the move. Walking a lot. Really likes her work.         Past AOMs  Wellbutrin- took in the past (2020_ for mood, per chart she did not like how she felt on it, felt too \"mellow\"            5/10/2024     1:38 PM   Weight History   How concerned are you about your weight? Very Concerned   I became overweight As an Adult   The following factors have contributed to my weight gain Mental Health Issues    Started on Medication that Caused Weight Gain    Eating Wrong Types of Food    Eating Too Much    Lack of Exercise    Stress   My lowest weight since age 18 was 170   My highest weight since age 18 was 210   The most weight I have ever lost was (lbs) 50   I have the following family history of obesity/being overweight I am the only one in my immediate family who is overweight   How has your weight changed over the last year? Gained           5/10/2024     1:38 PM   Diet Recall Review with Patient   If you do eat lunch, what types of food do you typically eat? sandwiches   How many cans/bottles of sugar pop/soda/tea/sports drinks do you drink in a day? 1   How many cans/bottles of diet pop/soda/tea or sports drink do you drink in a day? 1   How often do you have a drink of alcohol? Never           5/10/2024     1:38 PM   Eating Habits   Generally, my meals include foods like these bread, pasta, rice, potatoes, corn, crackers, sweet dessert, pop, or juice Almost Everyday   Generally, my meals include foods like these fried meats, brats, burgers, french fries, " pizza, cheese, chips, or ice cream Almost Everyday   Eat fast food (like McDonalds, Burger Dean, Taco Bell) Almost Everyday   Eat at a buffet or sit-down restaurant Once a Week   Rarely sit down for a meal but snack or graze throughout Never   Eat extra snacks between meals Never   Eat most of my food at the end of the day A Few Times a Week   Eat in the middle of the night or wake up at night to eat Never   Eat extra snacks to prevent or correct low blood sugar Never   Eat to prevent acid reflux or stomach pain Never   Worry about not having enough food to eat Never   I eat when I am depressed Never   I eat when I am stressed Once a Week   I finish all the food on my plate even if I am already full A Few Times a Week   I can't resist eating delicious food or walk past the good food/smell Everyday   I eat/snack without noticing that I am eating Never   I eat when I am preparing the meal Once a Week   I eat more than usual when I see others eating Less Than Weekly   I have trouble not eating sweets, ice cream, cookies, or chips if they are around the house Less Than Weekly   I think about food all day Almost Everyday   What foods, if any, do you crave? Sweets/Candy/Chocolate           5/10/2024     1:38 PM   Amount of Food   I feel out of control when eating Never   I eat a large amount of food, like a loaf of bread, a box of cookies, a pint/quart of ice cream, all at once Almost Everyday   I eat a large amount of food even when I am not hungry Never   I eat rapidly Everyday   I eat alone because I feel embarrassed and do not want others to see how much I have eaten Monthly   I eat until I am uncomfortably full Never   I feel bad, disgusted, or guilty after I overeat Monthly           5/10/2024     1:38 PM   Activity/Exercise History   How much of a typical 12 hour day do you spend sitting? Most of the Day   How much of a typical 12 hour day do you spend lying down? Less Than Half the Day   How much of a typical day  do you spend walking/standing? Most of the Day   How many hours (not including work) do you spend on the TV/Video Games/Computer/Tablet/Phone? 4-5 Hours   How many times a week are you active for the purpose of exercise? Once a Week   What keeps you from being more active? Shortness of Breath    Lack of Time   How many total minutes do you spend doing some activity for the purpose of exercising when you exercise? 15-30 Minutes       PAST MEDICAL HISTORY:  No past medical history on file.        5/10/2024     1:38 PM   Work/Social History Reviewed With Patient   My employment status is Full-Time   My job is [employer]   How much of your job is spent on the computer or phone? 100%   How many hours do you spend commuting to work daily? 10   What is your marital status? Single   Who do you live with? spouse   Who does the food shopping? me       Marijuana use: none, quit last year. Suspects it was worsening her mental health.   Alcohol use: none   Caffeine use: soda             5/10/2024     1:38 PM   Mental Health History Reviewed With Patient   Have you ever been physically or sexually abused? No   How often in the past 2 weeks have you felt little interest or pleasure in doing things? Not at all   Over the past 2 weeks how often have you felt down, depressed, or hopeless? Not at all             5/10/2024     1:38 PM   Sleep History Reviewed With Patient   How many hours do you sleep at night? 7         MEDICATIONS:   Current Outpatient Medications   Medication Sig Dispense Refill    OLANZapine (ZYPREXA) 20 MG tablet Take 1 tablet (20 mg) by mouth At Bedtime 30 tablet 1    polyethylene glycol (MIRALAX) 17 GM/Dose powder Take 17 g (1 Capful) by mouth daily 578 g 2    Semaglutide-Weight Management (WEGOVY) 0.25 MG/0.5ML pen Inject 0.25 mg Subcutaneous once a week For 4 weeks 2 mL 0    Semaglutide-Weight Management (WEGOVY) 0.5 MG/0.5ML pen Inject 0.5 mg Subcutaneous once a week After completing 4 weeks of 0.25mg dose 2 mL 0     "cetirizine (ZYRTEC) 10 MG tablet Take 10 mg by mouth daily (Patient not taking: Reported on 8/9/2022)      hydrOXYzine (ATARAX) 50 MG tablet Take 50 mg by mouth every 8 hours as needed for itching (Patient not taking: Reported on 5/10/2024)      lithium (ESKALITH CR/LITHOBID) 450 MG CR tablet Take 1 tablet (450 mg) by mouth every 12 hours (Patient not taking: Reported on 5/10/2024) 60 tablet 1           ALLERGIES:   No Known Allergies        5/10/2024     1:21 PM   JIMBO Score (Last Two)   JIMBO Raw Score 40   Activation Score 100   JIMBO Level 4               Objective    /84 (BP Location: Left arm, Patient Position: Sitting, Cuff Size: Adult Large)   Pulse 105   Ht 1.6 m (5' 3\")   Wt 122 kg (269 lb)   SpO2 97%   BMI 47.65 kg/m    /84 (BP Location: Left arm, Patient Position: Sitting, Cuff Size: Adult Large)   Pulse 105   Ht 1.6 m (5' 3\")   Wt 122 kg (269 lb)   SpO2 97%   BMI 47.65 kg/m    Body mass index is 47.65 kg/m .  Physical Exam   GENERAL: alert and no distress  EYES: Eyes grossly normal to inspection.  No discharge or erythema, or obvious scleral/conjunctival abnormalities.  RESP: No audible wheeze, cough, or visible cyanosis.    SKIN: Visible skin clear. No significant rash, abnormal pigmentation or lesions.  NEURO: Cranial nerves grossly intact.  Mentation and speech appropriate for age.  PSYCH: Appropriate affect, tone, and pace of words     Anti-obesity medication ROS:    HEENT  Hx of glaucoma: No    Cardiovascular  CAD:No  HTN:No      Gastrointestinal  GERD:No  Constipation:Yes Bms once a day but is straining to have bowel movements   Liver Dz:No  H/O Pancreatitis:No    Psychiatric  Bipolar: Yes  Anxiety:Yes  Depression:No , denies suicidal ideation   History of alcohol/drug abuse: No  Hx of eating disorder:No    Endocrine  Personal or family hx of MTC or MEN2:No  Diabetes/prediabetes: Yes    Neurologic:  Hx of seizures: No  Hx of migraines: Yes every few months   Memory Impairment:  is " forgetful, wonders if it's related to adhd   CVA history: No      History of kidney stones: No  Kidney disease: No  Current birth control: Yes : condoms and oral contraceptives   Interested in future pregnancy: in the future.     Taking Opioid/Narcotic: No        Sincerely,    Akua Parra PA-C

## 2024-06-11 NOTE — PROGRESS NOTES
"Video-Visit Details    Type of service:  Video Visit    Video Start Time: 11:02 AM   Video End Time: 11:32 AM     Originating Location (pt. Location): Home    Distant Location (provider location):  Offsite (providers home) [facility] [place]     Platform used for Video Visit: Act[employer]    New Weight Management Nutrition Consultation    Chino Ken is a 23 year old female presents today for new weight management nutrition consultation.  Patient referred by STELLA Leo on May 10, 2024.    Patient with Co-morbidities of obesity includin/10/2024     1:38 PM   --   I have the following health issues associated with obesity Pre-Diabetes    Polycystic Ovarian Syndrome   I have the following symptoms associated with obesity Knee Pain    Back Pain    Fatigue    Irregular Menstral Cycle     Anthropometrics:  Initial consult weight: 269 lb on 5/10/24.   Estimated body mass index is 49.6 kg/m  as calculated from the following:    Height as of this encounter: 1.6 m (5' 3\").    Weight as of this encounter: 127 kg (280 lb).  Current Weight: 280 lb      Medications for Weight Loss:  Wegovy - first injection given today.     NUTRITION HISTORY  Food allergies: NKFA  Food intolerances: None   Vitamin/Mineral Supplements: None   Previous methods of diet modification for weight loss: did not discuss  RD before: did not discuss     Typically eats 1-2 meals a day. Craves sweets . Is able to get full. Can sometimes stay full until next meal, will feel hungry 4 hours after feeling full. Does struggle with portion control. Does experience food noise. Does not experience emotional eating, wonders if she used to in the past, is unsure . Does not experience a loss of control around eating. Describes one episode of making herself throw up after overeating due to nausea after overeating.     Goals discussed with Akua   Cutting out all sugary drinks   Drinking more water   Walking 3-4 blocks " every day     Diet recall:   Breakfast - grilled chicken/egg whites, fruit, water     Hydration: Cutting out soda, drinking more water.     Patient very nervous to start Wegovy due to having a fear of needles. First 15 minutes of conversation with patient was walking her through how to provide her first Wegovy injection. Patient had her  give her injection while we were on video.         5/10/2024     1:38 PM   Diet Recall Review with Patient   If you do eat lunch, what types of food do you typically eat? sandwiches   How many cans/bottles of sugar pop/soda/tea/sports drinks do you drink in a day? 1   How many cans/bottles of diet pop/soda/tea or sports drink do you drink in a day? 1   How often do you have a drink of alcohol? Never           5/10/2024     1:38 PM   Eating Habits   Generally, my meals include foods like these bread, pasta, rice, potatoes, corn, crackers, sweet dessert, pop, or juice Almost Everyday   Generally, my meals include foods like these fried meats, brats, burgers, french fries, pizza, cheese, chips, or ice cream Almost Everyday   Eat fast food (like McDonalds, Burger Dean, Taco Bell) Almost Everyday   Eat at a buffet or sit-down restaurant Once a Week   Rarely sit down for a meal but snack or graze throughout Never   Eat extra snacks between meals Never   Eat most of my food at the end of the day A Few Times a Week   Eat in the middle of the night or wake up at night to eat Never   Eat extra snacks to prevent or correct low blood sugar Never   Eat to prevent acid reflux or stomach pain Never   Worry about not having enough food to eat Never   I eat when I am depressed Never   I eat when I am stressed Once a Week   I finish all the food on my plate even if I am already full A Few Times a Week   I can't resist eating delicious food or walk past the good food/smell Everyday   I eat/snack without noticing that I am eating Never   I eat when I am preparing the meal Once a Week   I eat  more than usual when I see others eating Less Than Weekly   I have trouble not eating sweets, ice cream, cookies, or chips if they are around the house Less Than Weekly   I think about food all day Almost Everyday   What foods, if any, do you crave? Sweets/Candy/Chocolate           5/10/2024     1:38 PM   Amount of Food   I feel out of control when eating Never   I eat a large amount of food, like a loaf of bread, a box of cookies, a pint/quart of ice cream, all at once Almost Everyday   I eat a large amount of food even when I am not hungry Never   I eat rapidly Everyday   I eat alone because I feel embarrassed and do not want others to see how much I have eaten Monthly   I eat until I am uncomfortably full Never   I feel bad, disgusted, or guilty after I overeat Monthly       Physical Activity:  Did not discuss         5/10/2024     1:38 PM   Activity/Exercise History   How much of a typical 12 hour day do you spend sitting? Most of the Day   How much of a typical 12 hour day do you spend lying down? Less Than Half the Day   How much of a typical day do you spend walking/standing? Most of the Day   How many hours (not including work) do you spend on the TV/Video Games/Computer/Tablet/Phone? 4-5 Hours   How many times a week are you active for the purpose of exercise? Once a Week   What keeps you from being more active? Shortness of Breath    Lack of Time   How many total minutes do you spend doing some activity for the purpose of exercising when you exercise? 15-30 Minutes     Nutrition Prescription  Recommended energy/nutrient modification.    Nutrition Diagnosis  Obesity r/t long history of positive energy balance aeb BMI >30.    Nutrition Intervention  Provided education on nutrition considerations when starting GLP1 medication including, changes in meal/snack volumes; meeting protein, hydration and micronutrient needs; limiting high-fat foods; and diet/lifestyle strategies for preventing constipation.  Patient  "demonstrates understanding. Co-developed goals to work towards.   Provided pt with list of goals and resources below via [employer].     Expected Engagement: good    Nutrition Goals  1) Have a good source of protein with all meals. Aim for at least 60 grams of protein per day.   2) Increase fruits and vegetables in your diet.   3) Aim for at least 64 oz of water daily.     The Plate Method  [email or web address]    Protein Sources   [email or web address]     Quick/Easy Protein Sources:  Hard boiled eggs  Part-skim cheese sticks  Baby Bell cheese rounds  Low-fat/low-sugar Greek yogurt  Low-fat cottage cheese  Lean deli meat (chicken/turkey/ham)  Roasted chickpeas or lentils  Nuts   Turkey meat stick  Protein shake/bar  \"P3\" snack (cheese, nuts, deli meat)  [employer]'s \"Protein Bread\"   \"Egglife\" egg white wrap    Tuna/salmon can/packet     Carbohydrates  [email or web address]     Mindful Eating  [email or web address]     Summary of Volumetrics Eating Plan  [email or web address]     Follow-Up: 1-2 months     Time spent with patient: 30 minutes.  Betsy Owen RD, LD    "

## 2024-06-12 ENCOUNTER — VIRTUAL VISIT (OUTPATIENT)
Dept: ENDOCRINOLOGY | Facility: CLINIC | Age: 24
End: 2024-06-12
Payer: COMMERCIAL

## 2024-06-12 VITALS — BODY MASS INDEX: 49.61 KG/M2 | HEIGHT: 63 IN | WEIGHT: 280 LBS

## 2024-06-12 DIAGNOSIS — E66.01 CLASS 3 SEVERE OBESITY WITH SERIOUS COMORBIDITY AND BODY MASS INDEX (BMI) OF 45.0 TO 49.9 IN ADULT, UNSPECIFIED OBESITY TYPE (H): ICD-10-CM

## 2024-06-12 DIAGNOSIS — Z71.3 NUTRITIONAL COUNSELING: Primary | ICD-10-CM

## 2024-06-12 DIAGNOSIS — E66.813 CLASS 3 SEVERE OBESITY WITH SERIOUS COMORBIDITY AND BODY MASS INDEX (BMI) OF 45.0 TO 49.9 IN ADULT, UNSPECIFIED OBESITY TYPE (H): ICD-10-CM

## 2024-06-12 PROCEDURE — 97802 MEDICAL NUTRITION INDIV IN: CPT | Mod: 95

## 2024-06-12 PROCEDURE — 99207 PR NO CHARGE LOS: CPT | Mod: 95

## 2024-06-12 ASSESSMENT — PAIN SCALES - GENERAL: PAINLEVEL: NO PAIN (0)

## 2024-06-12 NOTE — NURSING NOTE
Is the patient currently in the state of [place]? YES    Visit mode:VIDEO    If the visit is dropped, the patient can be reconnected by: VIDEO VISIT: Text to cell phone:   Telephone Information:   Mobile [phone]       Will anyone else be joining the visit? NO  (If patient encounters technical issues they should call [phone]    How would you like to obtain your AVS? MyChart    Are changes needed to the allergy or medication list? N/A    Are refills needed on medications prescribed by this physician? NO    Reason for visit: Consult    Marycarmen BERG

## 2024-06-12 NOTE — PATIENT INSTRUCTIONS
"Lonnie Magana,     It was nice to meet you today.     Follow-up with RD in 1-2 months.     Thank you,    Betsy Owen, MELANIE, LD  If you would like to schedule or reschedule an appointment with the RD, please call [phone]    Nutrition Goals  1) Have a good source of protein with all meals. Aim for at least 60 grams of protein per day.   2) Increase fruits and vegetables in your diet.   3) Aim for at least 64 oz of water daily.     The Plate Method  [email or web address]    Protein Sources   [email or web address]     Quick/Easy Protein Sources:  Hard boiled eggs  Part-skim cheese sticks  Baby Bell cheese rounds  Low-fat/low-sugar Greek yogurt  Low-fat cottage cheese  Lean deli meat (chicken/turkey/ham)  Roasted chickpeas or lentils  Nuts   Turkey meat stick  Protein shake/bar  \"P3\" snack (cheese, nuts, deli meat)  [employer]'s \"Protein Bread\"   \"Egglife\" egg white wrap    Tuna/salmon can/packet     Carbohydrates  [email or web address]     Mindful Eating  [email or web address]     Summary of Volumetrics Eating Plan  [email or web address]     COMPREHENSIVE WEIGHT MANAGEMENT PROGRAM  VIRTUAL SUPPORT GROUPS    At [facility], our Comprehensive Weight Management program offers on-line support groups for patients who are working on weight loss and considering, preparing for, or have had weight loss surgery.     There is no cost for this opportunity.  You are invited to attend the?Virtual Support Groups?provided by any of the following locations:    [facility] via Air[employer] Teams with Violet Mackey RN  2.   [place] via Air[employer] Teams with Dario Gaitan, PhD, LP  3.   [place] via Air[employer] Teams with Vidya Wilhelm RN  4.   [facility] via a Zoom Meeting with ZANE Walker    The following Support Group information can also be found on our website:  [email or web address]      [facility]" "[facility] Weight Loss Surgery Support Group  The support group is a patient-lead forum that meets monthly to share experiences, encouragement and education. It is open to those who have had weight loss surgery, are scheduled for surgery, or are considering surgery.   WHEN: This group meets on the 3rd Wednesday of each month from 5:00PM - 6:00PM virtually using Microsoft Teams.   FACILITATOR: Led by Violet Kidd RD, LD, RN, the program's Clinical Coordinator.   TO REGISTER: Please contact the clinic via [employer] or call the nurse line directly at [phone] to inform our staff that you would like an invite sent to you and to let us know the email you would like the invite sent to. Prior to the meeting, a link with directions on how to join the meeting will be sent to you.    2024 Meetings   January 17  February 21  March 20  April 17  May 15  Pema 19      [facility] Bariatric Care Support Group?  This is open to all pre- and post- operative bariatric surgery patients as well as their support system.   WHEN: This group meets the 3rd Tuesday of each month from 6:30 PM - 8:00 PM virtually using Microsoft Teams.   FACILITATOR: Led by Dario Gaitan, Ph.D who is a Licensed Psychologist with the [facility] Comprehensive Weight Management Program.   TO REGISTER: Please send an email to Dario Gaitan, Ph.D., LP at?[email or web address]?if you would like an invitation to the group. Prior to the meeting, a link with directions on how to join the meeting will be sent to you.    2024 Meetings January 16: \"Medication Management and Bariatric Surgery\", Suri Nichols, PharmD, Pharmacy Resident at [facility]  February 20: \"A Bariatric Surgery Patient's Perspective\", YOHANNES Adame, [facility], Behavioral Health Clinician at [facility]  March 19  April 16  May 21  Pema 18: \"Nutritional Labeling\", Dietitian " "speaker to be announced.  November 19: \"Holiday Eating\", Dietitian speaker to be announced.    [facility] Post-Operative Bariatric Surgery Support Group  This is a support group for [facility] bariatric patients (and those external to [facility]) who have had bariatric surgery and are at least 3 months post-surgery.  WHEN: This support group meets the 4th Wednesday of the month from 11:00 AM - 12:00 PM virtually using Microsoft Teams.   FACILITATOR: Led by Certified Bariatric Nurse, Vidya Wilhelm RN.   TO REGISTER: Please send an email to Vidya at [email or web address] if you would like an invitation to the group.  Prior to the meeting, a link with directions on how to join the meeting will be sent to you.    2024 Meetings  January 24  February 28  March 27  April 24  May 22  Pema 26    [facility] Healthy Lifestyle Group?  This is a 60 minute virtual coaching group for those who want to lead a healthier lifestyle. Come together to set goals and overcome barriers in a supportive group environment. We will address the four pillars of health: nutrition, exercise, sleep and emotional well-being.  This group is highly recommended for those who are participating in the 24 week Healthy Lifestyle Plan and our Health Coaching sessions.  WHEN: This group meets the 1st Friday of the month, 12:30 PM - 1:30 PM online, via a zoom meeting.    FACILITATOR: Led by National Board Certified Health and , Vidya Lorenzo [facility].   TO REGISTER: Please call the Call Center at [phone] to register. You will get an appointment to attend in [facility] [place]. Fifteen minutes prior to the meeting, complete the e-check in and you will get the link to join the meeting.    There is no charge to attend this group and space is limited.     2024 Meetings  January 5: \"New Years Vision: Manifest your Best 2024!\" (guided " "imagery,  journaling and discussion)  February 2: \"Let's Talk\"  March 1: \"10 Percent Happier\" by Berny Diaz (Book Bites - a guided discussion on the nuggets of wisdom from favorite wellness books, no need to read the book but highly encouraged)  April 5: \"Let's Talk\"  May 3: \"Essentialism: The Disciplined Pursuit of Less\" by José Jones (Book Bites discussion)  June 7: \"Let's Talk\"  July 5: NO MEETING, off for the 4th of July Holiday  August 2: \"The Blue Zones, Secrets for Living a Longer Life\" by Berny García (Book Bites discussion)        "

## 2024-08-27 ENCOUNTER — TELEPHONE (OUTPATIENT)
Dept: ENDOCRINOLOGY | Facility: CLINIC | Age: 24
End: 2024-08-27
Payer: COMMERCIAL

## 2024-08-27 NOTE — TELEPHONE ENCOUNTER
MTM appointment no showed, we made one more attempt to reschedule.     Routing back to referring provider and [facility] Pharmacist Team        Iain Barrios  MTM

## 2024-11-10 ENCOUNTER — HEALTH MAINTENANCE LETTER (OUTPATIENT)
Age: 24
End: 2024-11-10